# Patient Record
Sex: FEMALE | URBAN - METROPOLITAN AREA
[De-identification: names, ages, dates, MRNs, and addresses within clinical notes are randomized per-mention and may not be internally consistent; named-entity substitution may affect disease eponyms.]

---

## 2017-01-16 ENCOUNTER — OUTPATIENT (OUTPATIENT)
Dept: OUTPATIENT SERVICES | Facility: HOSPITAL | Age: 62
LOS: 1 days | Discharge: HOME | End: 2017-01-16

## 2017-01-16 DIAGNOSIS — E24.0 PITUITARY-DEPENDENT CUSHING'S DISEASE: ICD-10-CM

## 2017-06-05 ENCOUNTER — OUTPATIENT (OUTPATIENT)
Dept: OUTPATIENT SERVICES | Facility: HOSPITAL | Age: 62
LOS: 1 days | Discharge: HOME | End: 2017-06-05

## 2017-06-05 DIAGNOSIS — I48.91 UNSPECIFIED ATRIAL FIBRILLATION: ICD-10-CM

## 2017-06-05 DIAGNOSIS — E55.9 VITAMIN D DEFICIENCY, UNSPECIFIED: ICD-10-CM

## 2017-06-05 DIAGNOSIS — E03.9 HYPOTHYROIDISM, UNSPECIFIED: ICD-10-CM

## 2017-06-05 DIAGNOSIS — Z00.8 ENCOUNTER FOR OTHER GENERAL EXAMINATION: ICD-10-CM

## 2017-06-05 DIAGNOSIS — E78.4 OTHER HYPERLIPIDEMIA: ICD-10-CM

## 2017-06-05 DIAGNOSIS — E11.65 TYPE 2 DIABETES MELLITUS WITH HYPERGLYCEMIA: ICD-10-CM

## 2017-06-05 DIAGNOSIS — D53.9 NUTRITIONAL ANEMIA, UNSPECIFIED: ICD-10-CM

## 2018-04-27 ENCOUNTER — OUTPATIENT (OUTPATIENT)
Dept: OUTPATIENT SERVICES | Facility: HOSPITAL | Age: 63
LOS: 1 days | Discharge: HOME | End: 2018-04-27

## 2018-04-27 DIAGNOSIS — E05.00 THYROTOXICOSIS WITH DIFFUSE GOITER WITHOUT THYROTOXIC CRISIS OR STORM: ICD-10-CM

## 2018-04-27 DIAGNOSIS — R53.83 OTHER FATIGUE: ICD-10-CM

## 2018-08-07 ENCOUNTER — APPOINTMENT (OUTPATIENT)
Dept: ENDOCRINOLOGY | Facility: CLINIC | Age: 63
End: 2018-08-07

## 2018-11-02 ENCOUNTER — OUTPATIENT (OUTPATIENT)
Dept: OUTPATIENT SERVICES | Facility: HOSPITAL | Age: 63
LOS: 1 days | Discharge: HOME | End: 2018-11-02

## 2018-11-02 ENCOUNTER — OUTPATIENT (OUTPATIENT)
Dept: OUTPATIENT SERVICES | Facility: HOSPITAL | Age: 63
LOS: 1 days | Discharge: HOME | End: 2018-11-02
Payer: COMMERCIAL

## 2018-11-02 DIAGNOSIS — R05 COUGH: ICD-10-CM

## 2018-11-02 DIAGNOSIS — N95.1 MENOPAUSAL AND FEMALE CLIMACTERIC STATES: ICD-10-CM

## 2018-11-02 DIAGNOSIS — Z00.00 ENCOUNTER FOR GENERAL ADULT MEDICAL EXAMINATION WITHOUT ABNORMAL FINDINGS: ICD-10-CM

## 2018-11-02 DIAGNOSIS — N60.09 SOLITARY CYST OF UNSPECIFIED BREAST: ICD-10-CM

## 2018-11-02 PROCEDURE — 71046 X-RAY EXAM CHEST 2 VIEWS: CPT | Mod: 26

## 2018-11-13 ENCOUNTER — APPOINTMENT (OUTPATIENT)
Dept: ENDOCRINOLOGY | Facility: CLINIC | Age: 63
End: 2018-11-13
Payer: COMMERCIAL

## 2018-11-13 VITALS
BODY MASS INDEX: 36.8 KG/M2 | HEART RATE: 50 BPM | OXYGEN SATURATION: 97 % | SYSTOLIC BLOOD PRESSURE: 120 MMHG | WEIGHT: 200 LBS | DIASTOLIC BLOOD PRESSURE: 80 MMHG | HEIGHT: 62 IN

## 2018-11-13 DIAGNOSIS — Z80.3 FAMILY HISTORY OF MALIGNANT NEOPLASM OF BREAST: ICD-10-CM

## 2018-11-13 DIAGNOSIS — Z78.9 OTHER SPECIFIED HEALTH STATUS: ICD-10-CM

## 2018-11-13 DIAGNOSIS — R23.2 FLUSHING: ICD-10-CM

## 2018-11-13 DIAGNOSIS — Z83.511 FAMILY HISTORY OF GLAUCOMA: ICD-10-CM

## 2018-11-13 DIAGNOSIS — Z82.49 FAMILY HISTORY OF ISCHEMIC HEART DISEASE AND OTHER DISEASES OF THE CIRCULATORY SYSTEM: ICD-10-CM

## 2018-11-13 DIAGNOSIS — Z83.3 FAMILY HISTORY OF DIABETES MELLITUS: ICD-10-CM

## 2018-11-13 PROCEDURE — 99244 OFF/OP CNSLTJ NEW/EST MOD 40: CPT

## 2018-11-13 RX ORDER — ATENOLOL 25 MG/1
25 TABLET ORAL
Refills: 0 | Status: ACTIVE | COMMUNITY

## 2018-11-13 RX ORDER — IBUPROFEN 800 MG
TABLET ORAL
Refills: 0 | Status: ACTIVE | COMMUNITY

## 2018-11-13 RX ORDER — ASCORBIC ACID 500 MG
TABLET ORAL
Refills: 0 | Status: ACTIVE | COMMUNITY

## 2018-11-13 RX ORDER — FUROSEMIDE 20 MG/1
20 TABLET ORAL
Refills: 0 | Status: ACTIVE | COMMUNITY

## 2018-11-13 RX ORDER — POTASSIUM CHLORIDE 10 MEQ
10 CAPSULE, EXTENDED RELEASE ORAL
Refills: 0 | Status: ACTIVE | COMMUNITY

## 2018-11-15 LAB
T4 FREE SERPL-MCNC: 1.4 NG/DL
TSH RECEPTOR AB: 0.94 IU/L
TSH SERPL-ACNC: 2.19 UIU/ML
TSI ACT/NOR SER: <0.1 IU/L

## 2019-03-18 ENCOUNTER — APPOINTMENT (OUTPATIENT)
Dept: ENDOCRINOLOGY | Facility: CLINIC | Age: 64
End: 2019-03-18
Payer: COMMERCIAL

## 2019-03-18 VITALS
SYSTOLIC BLOOD PRESSURE: 118 MMHG | OXYGEN SATURATION: 98 % | WEIGHT: 208 LBS | BODY MASS INDEX: 38.28 KG/M2 | HEIGHT: 62 IN | HEART RATE: 82 BPM | DIASTOLIC BLOOD PRESSURE: 78 MMHG

## 2019-03-18 PROCEDURE — 99213 OFFICE O/P EST LOW 20 MIN: CPT

## 2019-03-18 NOTE — ASSESSMENT
[FreeTextEntry1] : Ms. NEHA RUVALCABA is a 63 year old female here to establish care for Graves' disease and obesity. \par \par 1) Graves' disease\par -TSH, TSHRAB and TSI was checked in Nov 2018 and they were all within normal limits.  \par -For now, no treatment is needed given that patient is biochemically and clinically euthyroid\par -Will check TPO antibody, free T4 and TSH given hypothyroid symptoms. \par \par 2) Obesity: Her thyroid function is normal unlikely related to hypothyroidism. \par Consider referral to weight management program.  \par Discussed diet and exercise \par Will check salivary cortisol. \par \par 3)Hot flashes\par I discussed with patient that the woman some initiative study has shown adverse effects of hormone replacement therapy in postmenopausal woman over the age of 60 years.  She will continue the risks and benefits discussion with her ob/gyn and hormone specialist.

## 2019-03-18 NOTE — DATA REVIEWED
[FreeTextEntry1] : 11/2/2018\par WBC 5.18\par RBC 4.76\par HEMOGLOBIN 13.9\par HCT 42.5\par MCH 29.2\par RDW 12.4\par \par EGFR 87\par MAGNESIUM 2.2\par EGFR 75\par SODIUM 141\par POTASSIUM 4.0\par CHLORIDE 105\par CO2 28\par ANION GAP 8\par GLUCOSE 95\par AST 16\par ALT 35\par ALK PHOS 54\par CHOLESTEROL 214\par \par CHOLESTEROL/HDL 3.9\par A1C 5.9%\par FERRITIN 102\par IRON 98\par VITAMIN D 25 43.5\par FSH 55.8\par LH 31.4\par TSH 1.65\par  9\par \par 2/26/2018\par RIGHT LOBE 5.4X1.2X1.8CM\par LEFT LOBE: 4.5X1.3X1.5CM \par ISTHMUS: 2MM\par RIGHT LOBE: 3MM COLLOID CYSTS IN THE POSTERIOR MID RIGHT THYROID LOBE.  \par \par Will be forwarding results\par

## 2019-03-18 NOTE — HISTORY OF PRESENT ILLNESS
[FreeTextEntry1] : Ms. NEHA RUVALCABA is 63 year old female here for endocrine follow up.  \par \par About a few years ago, she had a fall in her house.  She had a concussion and she was diagnosed with Graves' disease based on the MRI imaging.  She saw two endocrinologist since and she was told that thyroid functions had been normal.  She had the Graves' antibodies but no treatment was indicated.  Since then, she never had a problem with her thyroid function tests.  She was prescribed Atenolol for intermittent palpitation which had help to alleviate that symptoms. \par \par Currently, she has a lot of symptoms of under active thyroid including fatigue, brittle nails, constipation, migraine.  Sometimes she has insomnia and \par \par The patient endorses the following eye related symptoms, she was diagnosed with Graves orbitopathy.  She follows up with Dr. White from [place].  She saw Dr. Dipesh Wang.  (endocrinologist).  There is a sense of irritation in the eyes.  There is excessive tearing . There is eye or retro orbital discomfort or pain. There is no blurring of vision.  There is no double vision. she had recent surgery on the on the left eye.  \par \par She has hot flashes, in the past had normal metanephrines.  This occurred after menopause which was about 3-4 years ago.\par She had asked for hormone replacement therapy abut states due to family history of BRCA+, (herself was tested and was negative per patient), she was advised not to take the treatment.  She is seeing a hormone specialist.  She states that she is pending further laboratory and imaging workup, she may be starting on hormone replacement therapy.\par

## 2019-03-18 NOTE — REVIEW OF SYSTEMS
[Negative] : Heme/Lymph [Dry Eyes] : dryness of the eyes [Joint Pain] : joint pain [Joint Stiffness] : joint stiffness [Muscle Cramps] : muscle cramps [Muscle Weakness] : muscle weakness [Myalgia] : myalgia  [Back Pain] : back pain [Hair Loss] : hair loss

## 2019-03-18 NOTE — PHYSICAL EXAM
[Well Nourished] : well nourished [Well Developed] : well developed [Normal Hearing] : hearing was normal [Normal Nasal Mucosa] : the nasal mucosa was normal [No LAD] : no lymphadenopathy [Thyroid Not Enlarged] : the thyroid was not enlarged [Normal Rate] : heart rate was normal  [Normal S1, S2] : normal S1 and S2 [Regular Rhythm] : with a regular rhythm [Not Distended] : not distended [No Spinal Tenderness] : no spinal tenderness [No Motor Deficits] : the motor exam was normal [Normal Insight/Judgement] : insight and judgment were intact [No Tremors] : no tremors [Normal Affect] : the affect was normal [Normal Mood] : the mood was normal [de-identified] : wearing sun glasses.

## 2019-09-20 ENCOUNTER — EMERGENCY (EMERGENCY)
Facility: HOSPITAL | Age: 64
LOS: 1 days | Discharge: HOME | End: 2019-09-20
Attending: INTERNAL MEDICINE | Admitting: INTERNAL MEDICINE
Payer: COMMERCIAL

## 2019-09-20 VITALS
RESPIRATION RATE: 18 BRPM | HEIGHT: 62 IN | DIASTOLIC BLOOD PRESSURE: 88 MMHG | SYSTOLIC BLOOD PRESSURE: 148 MMHG | OXYGEN SATURATION: 95 % | HEART RATE: 81 BPM | TEMPERATURE: 98 F | WEIGHT: 188.94 LBS

## 2019-09-20 PROCEDURE — 99284 EMERGENCY DEPT VISIT MOD MDM: CPT | Mod: 25

## 2019-09-20 PROCEDURE — 96372 THER/PROPH/DIAG INJ SC/IM: CPT

## 2019-09-20 PROCEDURE — 99283 EMERGENCY DEPT VISIT LOW MDM: CPT

## 2019-09-20 RX ORDER — DEXAMETHASONE 0.5 MG/5ML
10 ELIXIR ORAL ONCE
Refills: 0 | Status: COMPLETED | OUTPATIENT
Start: 2019-09-20 | End: 2019-09-20

## 2019-09-20 RX ORDER — FAMOTIDINE 10 MG/ML
20 INJECTION INTRAVENOUS DAILY
Refills: 0 | Status: DISCONTINUED | OUTPATIENT
Start: 2019-09-20 | End: 2019-10-04

## 2019-09-20 RX ORDER — DIPHENHYDRAMINE HCL 50 MG
1 CAPSULE ORAL
Qty: 30 | Refills: 0
Start: 2019-09-20 | End: 2019-09-29

## 2019-09-20 RX ORDER — FAMOTIDINE 10 MG/ML
1 INJECTION INTRAVENOUS
Qty: 20 | Refills: 0
Start: 2019-09-20 | End: 2019-09-29

## 2019-09-20 RX ORDER — DIPHENHYDRAMINE HCL 50 MG
25 CAPSULE ORAL EVERY 4 HOURS
Refills: 0 | Status: DISCONTINUED | OUTPATIENT
Start: 2019-09-20 | End: 2019-10-04

## 2019-09-20 RX ORDER — AMOXICILLIN 250 MG/5ML
1 SUSPENSION, RECONSTITUTED, ORAL (ML) ORAL
Qty: 20 | Refills: 0
Start: 2019-09-20 | End: 2019-09-29

## 2019-09-20 RX ADMIN — Medication 10 MILLIGRAM(S): at 23:37

## 2019-09-20 RX ADMIN — Medication 1 TABLET(S): at 23:37

## 2019-09-20 RX ADMIN — FAMOTIDINE 20 MILLIGRAM(S): 10 INJECTION INTRAVENOUS at 23:38

## 2019-09-20 RX ADMIN — Medication 25 MILLIGRAM(S): at 23:37

## 2019-09-20 NOTE — ED ADULT NURSE NOTE - OBJECTIVE STATEMENT
pt  states she had two reddened areas on her right arm which started yesterday two reddened sarah noted

## 2019-09-20 NOTE — ED PROVIDER NOTE - PATIENT PORTAL LINK FT
You can access the FollowMyHealth Patient Portal offered by [facility] by registering at the following website: [email or web address]. By joining [facility]’s FollowMyHealth portal, you will also be able to view your health information using other applications (apps) compatible with our system.

## 2019-09-20 NOTE — ED ADULT NURSE NOTE - NSIMPLEMENTINTERV_GEN_ALL_ED
Implemented All Universal Safety Interventions:  [place] to call system. Call bell, personal items and telephone within reach. Instruct patient to call for assistance. Room bathroom lighting operational. Non-slip footwear when patient is off stretcher. Physically safe environment: no spills, clutter or unnecessary equipment. Stretcher in lowest position, wheels locked, appropriate side rails in place.

## 2019-09-20 NOTE — ED PROVIDER NOTE - OBJECTIVE STATEMENT
insect bite multiple sites R UL face R arm bite is swollen red painful, also has rash L thigh 2 weeks

## 2019-09-20 NOTE — ED ADULT TRIAGE NOTE - CHIEF COMPLAINT QUOTE
Patient presents with possible allergic reaction and/or bug bites. Patient verbalizes throat tightness. Patient able to speak in clear and complete sentences.

## 2019-09-21 VITALS
SYSTOLIC BLOOD PRESSURE: 142 MMHG | HEART RATE: 78 BPM | RESPIRATION RATE: 17 BRPM | TEMPERATURE: 98 F | DIASTOLIC BLOOD PRESSURE: 85 MMHG | OXYGEN SATURATION: 98 %

## 2019-09-23 ENCOUNTER — APPOINTMENT (OUTPATIENT)
Dept: ENDOCRINOLOGY | Facility: CLINIC | Age: 64
End: 2019-09-23

## 2019-12-03 ENCOUNTER — APPOINTMENT (OUTPATIENT)
Dept: ENDOCRINOLOGY | Facility: CLINIC | Age: 64
End: 2019-12-03

## 2020-01-17 ENCOUNTER — TRANSCRIPTION ENCOUNTER (OUTPATIENT)
Age: 65
End: 2020-01-17

## 2020-03-24 ENCOUNTER — APPOINTMENT (OUTPATIENT)
Dept: ENDOCRINOLOGY | Facility: CLINIC | Age: 65
End: 2020-03-24

## 2020-07-27 ENCOUNTER — APPOINTMENT (OUTPATIENT)
Dept: ENDOCRINOLOGY | Facility: CLINIC | Age: 65
End: 2020-07-27

## 2020-09-16 NOTE — ED ADULT TRIAGE NOTE - HEIGHT IN FEET
Size Of Lesion In Cm (Optional): 0
Detail Level: Simple
Body Location Override (Optional - Billing Will Still Be Based On Selected Body Map Location If Applicable): R mid back
5

## 2020-11-09 ENCOUNTER — APPOINTMENT (OUTPATIENT)
Dept: ENDOCRINOLOGY | Facility: CLINIC | Age: 65
End: 2020-11-09

## 2021-08-10 ENCOUNTER — APPOINTMENT (OUTPATIENT)
Dept: PULMONOLOGY | Facility: CLINIC | Age: 66
End: 2021-08-10

## 2021-09-28 ENCOUNTER — APPOINTMENT (OUTPATIENT)
Dept: PULMONOLOGY | Facility: CLINIC | Age: 66
End: 2021-09-28
Payer: COMMERCIAL

## 2021-09-28 VITALS
BODY MASS INDEX: 38.46 KG/M2 | HEIGHT: 62 IN | OXYGEN SATURATION: 95 % | HEART RATE: 74 BPM | TEMPERATURE: 97.4 F | WEIGHT: 209 LBS | SYSTOLIC BLOOD PRESSURE: 120 MMHG | DIASTOLIC BLOOD PRESSURE: 75 MMHG

## 2021-09-28 PROCEDURE — 99243 OFF/OP CNSLTJ NEW/EST LOW 30: CPT

## 2021-09-28 NOTE — ADDENDUM
[FreeTextEntry1] : Risks and benefits of Covid vaccination discussed.  Patient has been encouraged to get Covid vaccination

## 2021-10-26 ENCOUNTER — RX RENEWAL (OUTPATIENT)
Age: 66
End: 2021-10-26

## 2021-11-02 ENCOUNTER — APPOINTMENT (OUTPATIENT)
Dept: ENDOCRINOLOGY | Facility: CLINIC | Age: 66
End: 2021-11-02
Payer: COMMERCIAL

## 2021-11-02 VITALS
OXYGEN SATURATION: 97 % | SYSTOLIC BLOOD PRESSURE: 120 MMHG | HEART RATE: 59 BPM | DIASTOLIC BLOOD PRESSURE: 80 MMHG | BODY MASS INDEX: 36.21 KG/M2 | WEIGHT: 198 LBS | TEMPERATURE: 97.8 F

## 2021-11-02 DIAGNOSIS — E05.00 THYROTOXICOSIS WITH DIFFUSE GOITER W/OUT THYROTOXIC CRISIS OR STORM: ICD-10-CM

## 2021-11-02 PROCEDURE — 99214 OFFICE O/P EST MOD 30 MIN: CPT

## 2021-11-02 NOTE — PHYSICAL EXAM
[Alert] : alert [Well Nourished] : well nourished [No Acute Distress] : no acute distress [Well Developed] : well developed [Normal Sclera/Conjunctiva] : normal sclera/conjunctiva [EOMI] : extra ocular movement intact [Normal Oropharynx] : the oropharynx was normal [Thyroid Not Enlarged] : the thyroid was not enlarged [No Thyroid Nodules] : no palpable thyroid nodules [No Respiratory Distress] : no respiratory distress [No Accessory Muscle Use] : no accessory muscle use [Clear to Auscultation] : lungs were clear to auscultation bilaterally [Normal S1, S2] : normal S1 and S2 [Normal Rate] : heart rate was normal [Regular Rhythm] : with a regular rhythm [No Edema] : no peripheral edema [Pedal Pulses Normal] : the pedal pulses are present [Normal Bowel Sounds] : normal bowel sounds [Not Tender] : non-tender [Not Distended] : not distended [Soft] : abdomen soft [Normal Anterior Cervical Nodes] : no anterior cervical lymphadenopathy [Normal Posterior Cervical Nodes] : no posterior cervical lymphadenopathy [No Spinal Tenderness] : no spinal tenderness [Spine Straight] : spine straight [No Stigmata of Cushings Syndrome] : no stigmata of Cushings Syndrome [Normal Gait] : normal gait [Normal Strength/Tone] : muscle strength and tone were normal [No Rash] : no rash [Acanthosis Nigricans] : no acanthosis nigricans [Normal Reflexes] : deep tendon reflexes were 2+ and symmetric [No Tremors] : no tremors [Oriented x3] : oriented to person, place, and time [de-identified] : There is very mild proptosis.  Mild lid lag

## 2021-11-02 NOTE — HISTORY OF PRESENT ILLNESS
[FreeTextEntry1] : Ms. NEHA RUVALCABA is 66 year old female here for endocrine follow up.  \par \par About 5-6 years ago, she had a fall in her house.  She had a concussion and she was diagnosed with Graves' disease based on the MRI imaging.  She saw two endocrinologist since and she was told that thyroid functions had been normal.  She had the Graves' antibodies but no treatment was indicated.  Since then, she never had a problem with her thyroid function tests.  She was prescribed Atenolol for intermittent palpitation which had help to alleviate that symptoms. \par \par Currently, she has a lot of symptoms of under active thyroid including fatigue, brittle nails, constipation, migraine.  Sometimes she has insomnia and \par \par The patient endorses the following eye related symptoms, she was diagnosed with Graves orbitopathy.  She follows up with Dr. White from [place].  She saw Dr. Dipesh Wang.  (endocrinologist).  There is a sense of irritation in the eyes.  There is excessive tearing . There is eye or retro orbital discomfort or pain. There is no blurring of vision.  There is no double vision. she had recent surgery on the on the left eye.  \par \par She has hot flashes, in the past had normal metanephrines.  This occurred after menopause which was about 3-4 years ago.\par She had asked for hormone replacement therapy abut states due to family history of BRCA+, (herself was tested and was negative per patient), she was advised not to take the treatment. \par \par Last visit in 2019.  Recent blood work in July 2021 showed normal thyroid function. \par \par She wants to revisit Grave eye disease specailist.  She will see Dr. White again.  \par

## 2021-11-02 NOTE — DATA REVIEWED
[FreeTextEntry1] : Blood work done on 6/21/2021\par Total cholesterol 197\par HDL 55\par Triglyceride 95\par \par Non-\par Hemoglobin A1c 5.8%\par B12 >2000\par Folate 17.5\par Ferritin 126\par Vitamin D 29.6\par A.m. cortisol 8.6\par TSH 1.45\par T3 124\par Free T4 1.2\par Thyroglobulin 16\par Estradiol <5\par Progesterone 0.1\par FSH 56.7\par Thyroperoxidase antibody <10\par CEA 0.9\par CA-125 8\par CA 27–29 9.4\par C-reactive protein 5\par \par Thyroid ultrasound October 2020\par Findings: Isthmus: Normal size in AP dimension measuring 0.3 cm\par Right lobe: Measures 4.5 x 1.2 x 1.5 cm in sagittal by AP by transverse dimensions.\par 4.00 cc.  In the interpolar 3 x 2 x 3 mm colloid cyst stable does not need to be follow-up\par Color Doppler was unremarkable\par \par Left lobe: Measures 4.4 x 0.9 x 1.9 cm in sagittal by AP by transverse dimension.  Volume 3.4 cc.  Slightly heterogeneous without discrete nodule.  Color Doppler was unremarkable.\par \par Impression: 3 mm) does not need to follow-up.

## 2021-11-02 NOTE — ASSESSMENT
[FreeTextEntry1] : Patient is a 66-year-old woman here to to follow-up for Graves' disease.  Last seen in March 2019.\par \par 1.  Graves' disease\par TSH, TSH receptor antibody and TSI check in November 2018 were within normal limits.\par Repeat TFT\par Currently not on any thyroid medication\par We will repeat thyroid function level along with TSH receptor antibody and TSI\par \par 2.  Obesity\par Weight management referral was provided last time\par Discussed diet and exercise\par \par 3.  Graves' ophthalmopathy\par Referral for neuro-ophthalmologist/oculoplastic surgeon provided for patient.\par She will follow-up with Dr. White at [place].  She might see Dr. Tito Hdez. \par \par 4.  Colloid cyst\par Patient is noted to have a 3 mm right colloid cyst on the last ultrasound October 13, 2020.\par Discussed with patient that does not need to be follow-up with thyroid ultrasound in another 2 to 3 years.\par Patient stated that she wants to repeat the thyroid ultrasound now.  She is willing to pay out of pocket if it's not covered with medical insurance.\par \par FU in alejandro 2022\par If abnormal

## 2021-11-03 LAB
T3 SERPL-MCNC: 109 NG/DL
T3FREE SERPL-MCNC: 3 PG/ML
T4 FREE SERPL-MCNC: 1.3 NG/DL
T4 SERPL-MCNC: 8.1 UG/DL
TSH SERPL-ACNC: 1.42 UIU/ML

## 2021-11-04 LAB — TSI ACT/NOR SER: <0.1 IU/L

## 2021-11-05 LAB — TSH RECEPTOR AB: <1.1 IU/L

## 2021-11-27 ENCOUNTER — RX RENEWAL (OUTPATIENT)
Age: 66
End: 2021-11-27

## 2021-11-30 ENCOUNTER — OUTPATIENT (OUTPATIENT)
Dept: OUTPATIENT SERVICES | Facility: HOSPITAL | Age: 66
LOS: 1 days | Discharge: HOME | End: 2021-11-30

## 2021-11-30 DIAGNOSIS — R79.9 ABNORMAL FINDING OF BLOOD CHEMISTRY, UNSPECIFIED: ICD-10-CM

## 2021-12-01 ENCOUNTER — APPOINTMENT (OUTPATIENT)
Dept: HEMATOLOGY ONCOLOGY | Facility: CLINIC | Age: 66
End: 2021-12-01
Payer: COMMERCIAL

## 2021-12-01 ENCOUNTER — RESULT REVIEW (OUTPATIENT)
Age: 66
End: 2021-12-01

## 2021-12-01 ENCOUNTER — NON-APPOINTMENT (OUTPATIENT)
Age: 66
End: 2021-12-01

## 2021-12-01 VITALS
RESPIRATION RATE: 17 BRPM | HEART RATE: 91 BPM | TEMPERATURE: 97 F | DIASTOLIC BLOOD PRESSURE: 83 MMHG | SYSTOLIC BLOOD PRESSURE: 130 MMHG | HEIGHT: 62.2 IN | WEIGHT: 220.24 LBS | OXYGEN SATURATION: 94 % | BODY MASS INDEX: 40.02 KG/M2

## 2021-12-01 DIAGNOSIS — Z80.3 FAMILY HISTORY OF MALIGNANT NEOPLASM OF BREAST: ICD-10-CM

## 2021-12-01 DIAGNOSIS — Z80.49 FAMILY HISTORY OF MALIGNANT NEOPLASM OF OTHER GENITAL ORGANS: ICD-10-CM

## 2021-12-01 DIAGNOSIS — Z92.89 PERSONAL HISTORY OF OTHER MEDICAL TREATMENT: ICD-10-CM

## 2021-12-01 DIAGNOSIS — Z98.890 OTHER SPECIFIED POSTPROCEDURAL STATES: ICD-10-CM

## 2021-12-01 DIAGNOSIS — Z87.891 PERSONAL HISTORY OF NICOTINE DEPENDENCE: ICD-10-CM

## 2021-12-01 DIAGNOSIS — Z80.9 FAMILY HISTORY OF MALIGNANT NEOPLASM, UNSPECIFIED: ICD-10-CM

## 2021-12-01 LAB
APTT BLD: 29.3 SEC
BASOPHILS # BLD AUTO: 0.07 K/UL — SIGNIFICANT CHANGE UP (ref 0–0.2)
BASOPHILS NFR BLD AUTO: 1.1 % — SIGNIFICANT CHANGE UP (ref 0–2)
EOSINOPHIL # BLD AUTO: 0.13 K/UL — SIGNIFICANT CHANGE UP (ref 0–0.5)
EOSINOPHIL NFR BLD AUTO: 2.1 % — SIGNIFICANT CHANGE UP (ref 0–6)
FERRITIN SERPL-MCNC: 100 NG/ML
FOLATE SERPL-MCNC: 11.3 NG/ML
HCT VFR BLD CALC: 48.1 % — HIGH (ref 34.5–45)
HGB BLD-MCNC: 15.1 G/DL — SIGNIFICANT CHANGE UP (ref 11.5–15.5)
IMM GRANULOCYTES NFR BLD AUTO: 0.3 % — SIGNIFICANT CHANGE UP (ref 0–1.5)
INR PPP: 0.92 RATIO
IRON SATN MFR SERPL: 19 %
IRON SERPL-MCNC: 70 UG/DL
LYMPHOCYTES # BLD AUTO: 1.99 K/UL — SIGNIFICANT CHANGE UP (ref 1–3.3)
LYMPHOCYTES # BLD AUTO: 31.7 % — SIGNIFICANT CHANGE UP (ref 13–44)
MCHC RBC-ENTMCNC: 29 PG — SIGNIFICANT CHANGE UP (ref 27–34)
MCHC RBC-ENTMCNC: 31.4 G/DL — LOW (ref 32–36)
MCV RBC AUTO: 92.3 FL — SIGNIFICANT CHANGE UP (ref 80–100)
MONOCYTES # BLD AUTO: 0.49 K/UL — SIGNIFICANT CHANGE UP (ref 0–0.9)
MONOCYTES NFR BLD AUTO: 7.8 % — SIGNIFICANT CHANGE UP (ref 2–14)
NEUTROPHILS # BLD AUTO: 3.57 K/UL — SIGNIFICANT CHANGE UP (ref 1.8–7.4)
NEUTROPHILS NFR BLD AUTO: 57 % — SIGNIFICANT CHANGE UP (ref 43–77)
NRBC # BLD: 0 /100 WBCS — SIGNIFICANT CHANGE UP (ref 0–0)
PLATELET # BLD AUTO: 148 K/UL — LOW (ref 150–400)
PT BLD: 11 SEC
RBC # BLD: 5.21 M/UL — HIGH (ref 3.8–5.2)
RBC # FLD: 12.5 % — SIGNIFICANT CHANGE UP (ref 10.3–14.5)
RETICS #: 82.8 K/UL — SIGNIFICANT CHANGE UP (ref 25–125)
RETICS/RBC NFR: 1.6 % — SIGNIFICANT CHANGE UP (ref 0.5–2.5)
TIBC SERPL-MCNC: 368 UG/DL
UIBC SERPL-MCNC: 299 UG/DL
VIT B12 SERPL-MCNC: 704 PG/ML
WBC # BLD: 6.27 K/UL — SIGNIFICANT CHANGE UP (ref 3.8–10.5)
WBC # FLD AUTO: 6.27 K/UL — SIGNIFICANT CHANGE UP (ref 3.8–10.5)

## 2021-12-01 PROCEDURE — 99243 OFF/OP CNSLTJ NEW/EST LOW 30: CPT

## 2021-12-01 RX ORDER — ASPIRIN 325 MG/1
325 TABLET, FILM COATED ORAL
Refills: 0 | Status: ACTIVE | COMMUNITY

## 2021-12-01 RX ORDER — MONTELUKAST 10 MG/1
10 TABLET, FILM COATED ORAL DAILY
Qty: 30 | Refills: 0 | Status: DISCONTINUED | COMMUNITY
Start: 2021-09-28 | End: 2021-12-01

## 2021-12-01 NOTE — REASON FOR VISIT
[Initial Consultation] : an initial consultation for [Spouse] : spouse [FreeTextEntry2] : elevated Hct.

## 2021-12-01 NOTE — ASSESSMENT
[FreeTextEntry1] : Lab work reviewed.\par Mildly elevated hematocrit–differential diagnosis discussed with patient.  Reviewed potential complications if underlying myeloproliferative disorder, and treatment available.? Secondary/reactive process-?hemoconcentration with daily diuretic use.\par Follow-up lab work to be done, including erythropoietin level, JAK2 mutation studies.  P.O. hydration as tolerated.\par \par Pending the above and should hematologic scenario worsen/change, can decide if prudent to pursue BMB to rule out underlying evolving MPD, and/or consider empiric phlebotomy.\par \par Patient was given the opportunity to ask questions.  Her questions have been answered to her apparent satisfaction at this time.  She expressed her understanding and willingness to comply with recommended follow-up.\par

## 2021-12-01 NOTE — RESULTS/DATA
[FreeTextEntry1] : 7/13/2021–hemoglobin 14.4, hematocrit 46.1, RBC 4.84, WBC 7.33 with 66% neutrophils, 19% lymphocytes, platelet count 174,000.  SPEP with normal electrophoresis pattern.  Calcium 9.7, BLAYNE negative, rheumatoid factor less than 10.\par 6/21/2021–hemoglobin 13.9, hematocrit 46.9, RBC 4.86, WBC 4.45, lately count 191,000, sed rate 13, hemoglobin electrophoresis with normal pattern.  Creatinine 0.56.  Total bilirubin 0.4.  AST 21/ALT 27/alkaline phosphatase 49.  B12 greater than 2000, folate 17.5, serum iron 100, 28% iron saturation, ferritin 126.  Homocysteine 6.9.

## 2021-12-01 NOTE — HISTORY OF PRESENT ILLNESS
[de-identified] : 12/1/21-Patient presenting at the request of her PCP for an elevated Hct. found on routine lab work.\par \par No pulmonary/GI//bony/neuro complaints at this time. No fevers. No H/A. No h/o abnormal bleeding.\par Has appt. scheduled with Dr. Damon (vascular) for varicose veins.\par Seeing Dr. Balderas (endocrine) for hyperthyroidism.\par Reports has been tested BRCA negative (+FH cancers).\par Takes Motrin daily for "frozen shoulders." Bruises easily.\par Has chosen not to get COVID vaccines.

## 2021-12-01 NOTE — CONSULT LETTER
[Dear  ___] : Dear  [unfilled], [Consult Letter:] : I had the pleasure of evaluating your patient, [unfilled]. [Please see my note below.] : Please see my note below. [Consult Closing:] : Thank you very much for allowing me to participate in the care of this patient.  If you have any questions, please do not hesitate to contact me. [Sincerely,] : Sincerely, [FreeTextEntry3] : May Joyce MD

## 2021-12-01 NOTE — REVIEW OF SYSTEMS
[Negative] : Allergic/Immunologic [Patient Intake Form Reviewed] : Patient intake form was reviewed [Lower Ext Edema] : lower extremity edema [Swollen Glands] : no swollen glands [FreeTextEntry9] : shoulder stiffness

## 2021-12-02 LAB
EPO SERPL-MCNC: 6.8 MIU/ML
FACT VIII ACT/NOR PPP: 151 %
VWF AG PPP IA-ACNC: 179 %
VWF:RCO ACT/NOR PPP PL AGG: 161 %

## 2021-12-10 LAB — JAK2RLX: NORMAL

## 2022-01-02 ENCOUNTER — OUTPATIENT (OUTPATIENT)
Dept: OUTPATIENT SERVICES | Facility: HOSPITAL | Age: 67
LOS: 1 days | Discharge: HOME | End: 2022-01-02

## 2022-01-02 DIAGNOSIS — R79.9 ABNORMAL FINDING OF BLOOD CHEMISTRY, UNSPECIFIED: ICD-10-CM

## 2022-01-04 ENCOUNTER — APPOINTMENT (OUTPATIENT)
Dept: HEMATOLOGY ONCOLOGY | Facility: CLINIC | Age: 67
End: 2022-01-04
Payer: COMMERCIAL

## 2022-01-04 PROCEDURE — 99213 OFFICE O/P EST LOW 20 MIN: CPT | Mod: 95

## 2022-01-04 NOTE — ASSESSMENT
[FreeTextEntry1] : Lab work reviewed.\par Mildly elevated hematocrit–differential diagnosis discussed with patient. Erythropoietin level WNL and TUTU 2 mutation studies normal. Currently clinically suspect secondary/reactive process-?hemoconcentration with daily diuretic use.\par P.O. hydration as tolerated. Hematologic surveillance for now.\par \par Mild thrombocytopenia-interval repeat CBC to be done-if persistent-further evaluation warranted. No overt bleeding reported at present.\par \par Should hematologic scenario worsen/change, can decide if prudent to pursue BMB to rule out underlying evolving BM/MPD. \par \par Patient was given the opportunity to ask questions.  Her questions have been answered to her apparent satisfaction at this time.  She expressed her understanding and willingness to comply with recommended follow-up.\par

## 2022-01-04 NOTE — HISTORY OF PRESENT ILLNESS
[de-identified] : 12/1/21-Patient presented at the request of her PCP for an elevated Hct. found on routine lab work.\par \par \par  [de-identified] : No pulmonary/GI//bony/neuro complaints at this time. No fevers. No H/A. No h/o abnormal bleeding.\soraida Has appt. with Dr. Damon (vascular) for varicose veins in 2/2022.\soraida Sees Dr. Balderas (endocrine) for hyperthyroidism-told recent evaluation was "fine."\soraida Has chosen not to get COVID vaccines.

## 2022-01-04 NOTE — PHYSICAL EXAM
[Normal] : affect appropriate [de-identified] : breathing appeared normal [de-identified] : coloration appeared normal

## 2022-01-04 NOTE — REASON FOR VISIT
[Home] : at home, [unfilled] , at the time of the visit. [Medical Office: ([facility])___] : at the medical office located in  [Verbal consent obtained from patient] : the patient, [unfilled] [Follow-Up Visit] : a follow-up visit for [Spouse] : spouse [FreeTextEntry2] : elevated Hematocrit

## 2022-01-07 ENCOUNTER — NON-APPOINTMENT (OUTPATIENT)
Age: 67
End: 2022-01-07

## 2022-03-29 ENCOUNTER — OUTPATIENT (OUTPATIENT)
Dept: OUTPATIENT SERVICES | Facility: HOSPITAL | Age: 67
LOS: 1 days | Discharge: HOME | End: 2022-03-29

## 2022-03-29 DIAGNOSIS — R79.9 ABNORMAL FINDING OF BLOOD CHEMISTRY, UNSPECIFIED: ICD-10-CM

## 2022-04-01 ENCOUNTER — OUTPATIENT (OUTPATIENT)
Dept: OUTPATIENT SERVICES | Facility: HOSPITAL | Age: 67
LOS: 1 days | End: 2022-04-01
Payer: COMMERCIAL

## 2022-04-01 ENCOUNTER — RESULT REVIEW (OUTPATIENT)
Age: 67
End: 2022-04-01

## 2022-04-01 ENCOUNTER — APPOINTMENT (OUTPATIENT)
Dept: HEMATOLOGY ONCOLOGY | Facility: CLINIC | Age: 67
End: 2022-04-01

## 2022-04-01 DIAGNOSIS — R71.8 OTHER ABNORMALITY OF RED BLOOD CELLS: ICD-10-CM

## 2022-04-01 LAB
BASOPHILS # BLD AUTO: 0.04 K/UL — SIGNIFICANT CHANGE UP (ref 0–0.2)
BASOPHILS NFR BLD AUTO: 0.6 % — SIGNIFICANT CHANGE UP (ref 0–2)
EOSINOPHIL # BLD AUTO: 0.11 K/UL — SIGNIFICANT CHANGE UP (ref 0–0.5)
EOSINOPHIL NFR BLD AUTO: 1.8 % — SIGNIFICANT CHANGE UP (ref 0–6)
HCT VFR BLD CALC: 40.6 % — SIGNIFICANT CHANGE UP (ref 34.5–45)
HGB BLD-MCNC: 12.9 G/DL — SIGNIFICANT CHANGE UP (ref 11.5–15.5)
IMM GRANULOCYTES NFR BLD AUTO: 0.3 % — SIGNIFICANT CHANGE UP (ref 0–1.5)
LYMPHOCYTES # BLD AUTO: 1.96 K/UL — SIGNIFICANT CHANGE UP (ref 1–3.3)
LYMPHOCYTES # BLD AUTO: 31.3 % — SIGNIFICANT CHANGE UP (ref 13–44)
MCHC RBC-ENTMCNC: 29.4 PG — SIGNIFICANT CHANGE UP (ref 27–34)
MCHC RBC-ENTMCNC: 31.8 G/DL — LOW (ref 32–36)
MCV RBC AUTO: 92.5 FL — SIGNIFICANT CHANGE UP (ref 80–100)
MONOCYTES # BLD AUTO: 0.45 K/UL — SIGNIFICANT CHANGE UP (ref 0–0.9)
MONOCYTES NFR BLD AUTO: 7.2 % — SIGNIFICANT CHANGE UP (ref 2–14)
NEUTROPHILS # BLD AUTO: 3.68 K/UL — SIGNIFICANT CHANGE UP (ref 1.8–7.4)
NEUTROPHILS NFR BLD AUTO: 58.8 % — SIGNIFICANT CHANGE UP (ref 43–77)
NRBC # BLD: 0 /100 WBCS — SIGNIFICANT CHANGE UP (ref 0–0)
PLATELET # BLD AUTO: 168 K/UL — SIGNIFICANT CHANGE UP (ref 150–400)
RBC # BLD: 4.39 M/UL — SIGNIFICANT CHANGE UP (ref 3.8–5.2)
RBC # FLD: 12.7 % — SIGNIFICANT CHANGE UP (ref 10.3–14.5)
WBC # BLD: 6.26 K/UL — SIGNIFICANT CHANGE UP (ref 3.8–10.5)
WBC # FLD AUTO: 6.26 K/UL — SIGNIFICANT CHANGE UP (ref 3.8–10.5)

## 2022-04-01 PROCEDURE — 86850 RBC ANTIBODY SCREEN: CPT

## 2022-04-01 PROCEDURE — 86900 BLOOD TYPING SEROLOGIC ABO: CPT

## 2022-04-01 PROCEDURE — 86901 BLOOD TYPING SEROLOGIC RH(D): CPT

## 2022-04-13 ENCOUNTER — NON-APPOINTMENT (OUTPATIENT)
Age: 67
End: 2022-04-13

## 2022-04-14 ENCOUNTER — OUTPATIENT (OUTPATIENT)
Dept: OUTPATIENT SERVICES | Facility: HOSPITAL | Age: 67
LOS: 1 days | End: 2022-04-14
Payer: COMMERCIAL

## 2022-04-14 DIAGNOSIS — R06.2 WHEEZING: ICD-10-CM

## 2022-04-14 PROCEDURE — 94060 EVALUATION OF WHEEZING: CPT

## 2022-04-14 PROCEDURE — 94729 DIFFUSING CAPACITY: CPT | Mod: 26

## 2022-04-14 PROCEDURE — 94729 DIFFUSING CAPACITY: CPT

## 2022-04-14 PROCEDURE — 94726 PLETHYSMOGRAPHY LUNG VOLUMES: CPT | Mod: 26

## 2022-04-14 PROCEDURE — 94726 PLETHYSMOGRAPHY LUNG VOLUMES: CPT

## 2022-04-14 PROCEDURE — 94060 EVALUATION OF WHEEZING: CPT | Mod: 26

## 2022-05-09 ENCOUNTER — APPOINTMENT (OUTPATIENT)
Dept: NEUROLOGY | Facility: CLINIC | Age: 67
End: 2022-05-09
Payer: COMMERCIAL

## 2022-05-09 VITALS
SYSTOLIC BLOOD PRESSURE: 128 MMHG | WEIGHT: 198 LBS | BODY MASS INDEX: 35.08 KG/M2 | HEART RATE: 84 BPM | HEIGHT: 63 IN | DIASTOLIC BLOOD PRESSURE: 72 MMHG

## 2022-05-09 DIAGNOSIS — G44.40 DRUG-INDUCED HEADACHE, NOT ELSEWHERE CLASSIFIED, NOT INTRACTABLE: ICD-10-CM

## 2022-05-09 DIAGNOSIS — F51.04 PSYCHOPHYSIOLOGIC INSOMNIA: ICD-10-CM

## 2022-05-09 PROCEDURE — 99205 OFFICE O/P NEW HI 60 MIN: CPT

## 2022-05-09 NOTE — HISTORY OF PRESENT ILLNESS
[FreeTextEntry1] : Rosalia Sparks is a 66-year-old female with obesity chronic insomnia Graves' disease who presents with at least 3 years of a chronic daily headache which occurs in the morning on awakening.  She takes 2 Excedrin every morning and gets improvement.  She also has chronic bilateral shoulder pain and takes Motrin 800 mg on the average of once daily.  She has had a trial of Ubrelvy 100 mg with some benefit and it was no help with Fioricet.  The headache is described as frontal and bitemporal and sharp and also pressure in quality.  The headache seems to have begun in the postmenopausal.  She describes menstrual headache occurring decades ago.  She denies any visual aura or other associated neurological symptoms. \par \par She has chronic insomnia.  Years ago she reports having a negative test for sleep apnea.\par \par She has had MRI scanning of the brain noncontrast performed on 8/9/2021 and 7/18/2016 at the [facility] radiology group.  She has stable prominence to CSF spaces overlying the frontal lobe convexities.  There was no mass-effect.  There were no acute findings and there was no change in the images.\par \par Cervical MRI performed on 3/25/2022 reveals bulging and herniated disks and degenerative change and T1 vertebral body intraosseous cyst.\par \par She has had episodes of head trauma in the past.\par \par There is a past history of Graves' disease.  She was evaluated by hematology for an elevated hematocrit with a negative work-up.\par \par Medication includes Motrin 800 mg daily as needed and Excedrin 2 tablets in the a.m. daily aspirin and she stopped famotidine since this seemed to exacerbate her headache.  She also receives atenolol for palpitations and possibly some mild hypertension.\par \par

## 2022-05-09 NOTE — CONSULT LETTER
[Dear  ___] : Dear  [unfilled], [Consult Letter:] : I had the pleasure of evaluating your patient, [unfilled]. [Please see my note below.] : Please see my note below. [Consult Closing:] : Thank you very much for allowing me to participate in the care of this patient.  If you have any questions, please do not hesitate to contact me. [Sincerely,] : Sincerely, [FreeTextEntry3] : Stanislaw Rushing MD\par

## 2022-05-09 NOTE — ASSESSMENT
[FreeTextEntry1] : Impression: This 66-year-old female patient with history of obesity Graves' disease chronic insomnia presents with a chronic history of daily headache which occurs in the morning on awakening.  She has been evaluated for sleep apnea several years ago and she describes a negative test.  There is the daily frequent use of Excedrin which she takes each morning.  There is probably a component of medication overuse headache.  Rule out sleep apnea in spite of the prior history of having a negative test.  Brain MRI revealing stable prominent CSF spaces overlying the frontal convexities is considered clinically insignificant finding and not associated with headache.  Cervical MRI revealing disc herniations also probably not going to be related to her complaint of headache.\par \par Recommendations: Topiramate 25 mg at bedtime for the first 1 week and then increase to 50 mg at bedtime.  Naproxen 500 mg twice daily as needed to replace Motrin and possibly replace Excedrin.  Referral to Dr. Ramírez for chronic insomnia and possible sleep apnea with early morning headache.  Office follow-up as directed.\par

## 2022-05-09 NOTE — PHYSICAL EXAM
[FreeTextEntry1] : Head:  Normocephalic Neck: Supple nontender no carotid bruits.  \par \par Mental Status:  Alert Oriented X3 Speech normal and no aphasia or dysarthria.\par \par Cranial Nerves:  PERRL, Fundi normal Visual Fields full  EOMI no diplopia no ptosis no nystagmus, V through XII intact.\par \par Motor:  No drift, normal strength tone and coordination and no focal atrophy. No abnormal movements. No dysmetria.  Normal rapid alternating movements.  Reduced range of motion both shoulders.\par \par DTRs: Symmetric and 2+.  Plantars flexor.  No Clonus.\par \par Sensory:  Normal testing with pin light touch and vibration and  Joint position sense.  Normal DSS to touch.\par \par Gait:  Normal including tandem walking heel toe walking and Rhomberg.\par

## 2022-05-13 ENCOUNTER — APPOINTMENT (OUTPATIENT)
Dept: PULMONOLOGY | Facility: CLINIC | Age: 67
End: 2022-05-13
Payer: COMMERCIAL

## 2022-05-13 VITALS
HEART RATE: 68 BPM | HEIGHT: 63 IN | WEIGHT: 198 LBS | TEMPERATURE: 98.2 F | BODY MASS INDEX: 35.08 KG/M2 | DIASTOLIC BLOOD PRESSURE: 78 MMHG | OXYGEN SATURATION: 97 % | SYSTOLIC BLOOD PRESSURE: 120 MMHG

## 2022-05-13 PROCEDURE — 99213 OFFICE O/P EST LOW 20 MIN: CPT

## 2022-05-13 NOTE — ASSESSMENT
[FreeTextEntry1] : With history of Graves' disease must also consider possibility of substernal thyroid with tracheal compression secondary to enlarged thyroid.  CT of chest ordered.  Pulmonary function testing reviewed with patient.  No significant obstruction noted.  Patient will follow up with local endocrinologist.\par Patient may also have asthmatic component.  Patient has been asked to restart Singulair and follow weights and blood pressure at home because of her concerns about sodium.

## 2022-05-13 NOTE — REVIEW OF SYSTEMS
[Wheezing] : wheezing [Negative] : Endocrine [TextBox_30] : Wheezing at night when she lays down and exercise intolerance [TextBox_94] : Shoulder tightness chronic

## 2022-05-13 NOTE — REASON FOR VISIT
[Follow-Up] : a follow-up visit [Shortness of Breath] : shortness of breath [Wheezing] : wheezing [Spouse] : spouse

## 2022-06-13 ENCOUNTER — APPOINTMENT (OUTPATIENT)
Dept: NEUROLOGY | Facility: CLINIC | Age: 67
End: 2022-06-13

## 2022-06-21 LAB
FT4I SERPL CALC-MCNC: 8.3 INDEX
T3FREE SERPL-MCNC: 3.26 PG/ML
TSH SERPL-ACNC: 1.42 UIU/ML

## 2022-06-22 ENCOUNTER — OUTPATIENT (OUTPATIENT)
Dept: OUTPATIENT SERVICES | Facility: HOSPITAL | Age: 67
LOS: 1 days | Discharge: HOME | End: 2022-06-22

## 2022-06-22 DIAGNOSIS — R79.9 ABNORMAL FINDING OF BLOOD CHEMISTRY, UNSPECIFIED: ICD-10-CM

## 2022-06-30 ENCOUNTER — APPOINTMENT (OUTPATIENT)
Dept: PULMONOLOGY | Facility: CLINIC | Age: 67
End: 2022-06-30

## 2022-06-30 VITALS
BODY MASS INDEX: 35.08 KG/M2 | OXYGEN SATURATION: 97 % | SYSTOLIC BLOOD PRESSURE: 120 MMHG | TEMPERATURE: 97.3 F | HEART RATE: 66 BPM | HEIGHT: 63 IN | DIASTOLIC BLOOD PRESSURE: 75 MMHG | WEIGHT: 198 LBS

## 2022-06-30 PROCEDURE — 99212 OFFICE O/P EST SF 10 MIN: CPT

## 2022-06-30 NOTE — REVIEW OF SYSTEMS
[Wheezing] : wheezing [Negative] : Endocrine [TextBox_30] : Occasional wheezing when walking [TextBox_94] : Shoulder tightness chronic

## 2022-07-01 ENCOUNTER — RESULT REVIEW (OUTPATIENT)
Age: 67
End: 2022-07-01

## 2022-07-01 ENCOUNTER — APPOINTMENT (OUTPATIENT)
Dept: HEMATOLOGY ONCOLOGY | Facility: CLINIC | Age: 67
End: 2022-07-01

## 2022-07-01 LAB
BASOPHILS # BLD AUTO: 0.09 K/UL — SIGNIFICANT CHANGE UP (ref 0–0.2)
BASOPHILS NFR BLD AUTO: 1.5 % — SIGNIFICANT CHANGE UP (ref 0–2)
EOSINOPHIL # BLD AUTO: 0.13 K/UL — SIGNIFICANT CHANGE UP (ref 0–0.5)
EOSINOPHIL NFR BLD AUTO: 2.2 % — SIGNIFICANT CHANGE UP (ref 0–6)
HCT VFR BLD CALC: 42.3 % — SIGNIFICANT CHANGE UP (ref 34.5–45)
HGB BLD-MCNC: 13.8 G/DL — SIGNIFICANT CHANGE UP (ref 11.5–15.5)
IMM GRANULOCYTES NFR BLD AUTO: 2 % — HIGH (ref 0–1.5)
LYMPHOCYTES # BLD AUTO: 1.89 K/UL — SIGNIFICANT CHANGE UP (ref 1–3.3)
LYMPHOCYTES # BLD AUTO: 32.2 % — SIGNIFICANT CHANGE UP (ref 13–44)
MCHC RBC-ENTMCNC: 29 PG — SIGNIFICANT CHANGE UP (ref 27–34)
MCHC RBC-ENTMCNC: 32.6 G/DL — SIGNIFICANT CHANGE UP (ref 32–36)
MCV RBC AUTO: 88.9 FL — SIGNIFICANT CHANGE UP (ref 80–100)
MONOCYTES # BLD AUTO: 0.56 K/UL — SIGNIFICANT CHANGE UP (ref 0–0.9)
MONOCYTES NFR BLD AUTO: 9.5 % — SIGNIFICANT CHANGE UP (ref 2–14)
NEUTROPHILS # BLD AUTO: 3.08 K/UL — SIGNIFICANT CHANGE UP (ref 1.8–7.4)
NEUTROPHILS NFR BLD AUTO: 52.6 % — SIGNIFICANT CHANGE UP (ref 43–77)
NRBC # BLD: 0 /100 WBCS — SIGNIFICANT CHANGE UP (ref 0–0)
PLATELET # BLD AUTO: 168 K/UL — SIGNIFICANT CHANGE UP (ref 150–400)
RBC # BLD: 4.76 M/UL — SIGNIFICANT CHANGE UP (ref 3.8–5.2)
RBC # FLD: 12.6 % — SIGNIFICANT CHANGE UP (ref 10.3–14.5)
WBC # BLD: 5.87 K/UL — SIGNIFICANT CHANGE UP (ref 3.8–10.5)
WBC # FLD AUTO: 5.87 K/UL — SIGNIFICANT CHANGE UP (ref 3.8–10.5)

## 2022-07-05 ENCOUNTER — APPOINTMENT (OUTPATIENT)
Dept: HEMATOLOGY ONCOLOGY | Facility: CLINIC | Age: 67
End: 2022-07-05

## 2022-07-05 PROCEDURE — 99442: CPT

## 2022-07-05 RX ORDER — LIDOCAINE 5% 700 MG/1
5 PATCH TOPICAL
Qty: 30 | Refills: 0 | Status: ACTIVE | COMMUNITY
Start: 2022-07-01

## 2022-07-05 NOTE — CONSULT LETTER
[Dear  ___] : Dear  [unfilled], [Courtesy Letter:] : I had the pleasure of seeing your patient, [unfilled], in my office today. [Please see my note below.] : Please see my note below. [Consult Closing:] : Thank you very much for allowing me to participate in the care of this patient.  If you have any questions, please do not hesitate to contact me. [Sincerely,] : Sincerely, [FreeTextEntry3] : May Joyce MD

## 2022-07-05 NOTE — REASON FOR VISIT
[Patient] : the patient [Self] : self [Follow-Up Visit] : a follow-up visit for [Home] : at home, [unfilled] , at the time of the visit. [Medical Office: ([facility])___] : at the medical office located in  [Verbal consent obtained from patient] : the patient, [unfilled] [FreeTextEntry2] : elevated hematocrit

## 2022-07-05 NOTE — HISTORY OF PRESENT ILLNESS
[de-identified] : 12/1/21-Patient presented at the request of her PCP for an elevated Hct. found on routine lab work. Erythropoietin level and TUTU 2 mutation studies WNL.\par \par \par  [de-identified] : A couple of months ago, noticed left nipple retraction. Had breast MRI ([facility]) which reportedly showed a small nodule; and then saw Dr. Thompson with excisional biopsy planned.\par Seeing Dr. Bennett for cardiology clearance prior to breast lumpectomy.\par S/P COVID infection 1 month ago.\par No current pulmonary/GI//bony/neuro complaints at this time. No fevers. No H/A. No h/o abnormal bleeding.\par Sees Dr. Balderas (endocrine) for hyperthyroidism.\par \par Had chosen not to get COVID vaccines.

## 2022-07-05 NOTE — ASSESSMENT
[FreeTextEntry1] : CBC reviewed.\par h/o Mildly elevated hematocrit–Erythropoietin level WNL and TUTU 2 mutation studies normal 12/2021. Most recent H/H normal. Currently clinically suspect secondary/reactive process-?hemoconcentration with daily diuretic use.\par P.O. hydration as tolerated. Hematologic surveillance for now.\par \par h/o Mild thrombocytopenia-last platelet count WNL.\par \par 2% imm. gran. noted-?recent antecedent COVID infection contributed. interval f/u CBC with diff. to be done.\par \par Should hematologic scenario worsen/change, can decide if prudent to pursue BMB to rule out underlying evolving BM/MPD. \par \par Patient was given the opportunity to ask questions.  Her questions have been answered to her apparent satisfaction at this time.  She expressed her understanding and willingness to comply with recommended follow-up.\par

## 2022-08-15 ENCOUNTER — APPOINTMENT (OUTPATIENT)
Dept: ENDOCRINOLOGY | Facility: CLINIC | Age: 67
End: 2022-08-15

## 2022-08-23 ENCOUNTER — APPOINTMENT (OUTPATIENT)
Dept: NEUROLOGY | Facility: CLINIC | Age: 67
End: 2022-08-23

## 2022-08-23 VITALS
SYSTOLIC BLOOD PRESSURE: 118 MMHG | HEIGHT: 63 IN | HEART RATE: 70 BPM | BODY MASS INDEX: 35.08 KG/M2 | DIASTOLIC BLOOD PRESSURE: 80 MMHG | WEIGHT: 198 LBS

## 2022-08-23 PROCEDURE — 99214 OFFICE O/P EST MOD 30 MIN: CPT

## 2022-08-23 NOTE — ASSESSMENT
[FreeTextEntry1] : Impression: This 66-year-old female with a history of Graves' disease obesity chronic insomnia and several year history of chronic daily early morning headache for which she takes Excedrin Migraine.  Rule out sleep apnea.  Rule out medication overuse headache regarding daily Excedrin.  She describes side effects from topiramate which was stopped after only few doses.  She did not try naproxen in place of the Excedrin.  There was no significant benefit with Ubrelvy which she only takes rarely.\par \par Recommendations: Begin a trial of nortriptyline 10 mg once at bedtime.  Patient will call in 1 week regarding her condition and response to medication.  It was again suggested that she pursue an evaluation for sleep apnea.  Potential side effects of nortriptyline have been discussed with the patient including dry mouth dizziness and increase in appetite.  Office follow-up as directed\par

## 2022-08-23 NOTE — PHYSICAL EXAM
[FreeTextEntry1] : Head:  Normocephalic Neck: Supple nontender no carotid bruits. \par \par Mental Status:  Alert Oriented X3 Speech normal and no aphasia or dysarthria.\par \par Cranial Nerves:  PERRL, Visual Fields full  EOMI no diplopia no ptosis no nystagmus, V through XII intact.\par \par Motor:  No drift, normal strength tone and coordination and no focal atrophy. No abnormal movements. No dysmetria.  Normal rapid alternating movements. \par \par DTRs: Symmetric and 2+.  Plantars flexor.  No Clonus.\par \par Sensory:  Normal testing with pin light touch and no lateralizing findings.\par \par Gait:  Normal including tandem walking heel toe walking and Rhomberg.\par

## 2022-08-23 NOTE — HISTORY OF PRESENT ILLNESS
[FreeTextEntry1] : Rosalia Sparks is seen for an office visit.  She was last seen by me on 5/9/2022.  At that time she was given topiramate 25 mg to take at bedtime.  After 3 doses she stopped the medication.  She states that the medication had side effects and was affecting her eyes.  She continues to take 2 Excedrin Migraine each morning for daily early morning headache on awakening.  The headache is a bifrontal.  She has a chronic history in this regard.  When she stops the Excedrin headache can be quite severe.  She will occasionally use Ubrelvy 100 mg without much benefit.  In the past she took Fioricet.  MRI of the brain has been negative.  \par \par She has chronic insomnia and that at the time of the last visit she was referred for sleep apnea evaluation which did she did not pursue

## 2022-08-25 ENCOUNTER — APPOINTMENT (OUTPATIENT)
Dept: PULMONOLOGY | Facility: CLINIC | Age: 67
End: 2022-08-25

## 2022-08-25 VITALS
TEMPERATURE: 97.8 F | OXYGEN SATURATION: 96 % | DIASTOLIC BLOOD PRESSURE: 80 MMHG | HEIGHT: 63 IN | SYSTOLIC BLOOD PRESSURE: 122 MMHG | WEIGHT: 205 LBS | BODY MASS INDEX: 36.32 KG/M2 | HEART RATE: 81 BPM

## 2022-08-25 PROCEDURE — 99212 OFFICE O/P EST SF 10 MIN: CPT

## 2022-08-25 RX ORDER — NORTRIPTYLINE HYDROCHLORIDE 10 MG/1
10 CAPSULE ORAL
Qty: 30 | Refills: 2 | Status: DISCONTINUED | COMMUNITY
Start: 2022-08-23 | End: 2022-08-25

## 2022-08-25 RX ORDER — TOPIRAMATE 25 MG/1
25 TABLET, FILM COATED ORAL
Qty: 60 | Refills: 2 | Status: DISCONTINUED | COMMUNITY
Start: 2022-05-09 | End: 2022-08-25

## 2022-08-25 NOTE — REVIEW OF SYSTEMS
[Cough] : no cough [Sputum] : no sputum [Wheezing] : no wheezing [Negative] : Endocrine [TextBox_30] : No wheezing since last visit [TextBox_94] : Shoulder tightness chronic

## 2022-10-07 ENCOUNTER — APPOINTMENT (OUTPATIENT)
Dept: OTOLARYNGOLOGY | Facility: CLINIC | Age: 67
End: 2022-10-07

## 2022-10-14 ENCOUNTER — APPOINTMENT (OUTPATIENT)
Dept: OTOLARYNGOLOGY | Facility: CLINIC | Age: 67
End: 2022-10-14

## 2022-10-20 NOTE — ED ADULT TRIAGE NOTE - MODE OF ARRIVAL
Progress Note  Alert, oriented, in her room.  New today- reviewed meds, competency so she could consent to dialysis catheter removal. She is deemed competent.   -She is taking care of paper work such as will and property management. Her brother and friend are helping her.     Chief Complaint:  Admitted with hypotension and shock. DREA was on Replacement therapy.     Now she appears to have regained renal function and dialysis catherter out.       ROS:  CONSTITUTIONAL: Denies pain and shortness of breath. In bed, comfortable. Has some fatigue,no fever.no  headaches  EYES:  No change in vision  ENT/o/p: No thyromegaly, No JVD  CV:  No chest pain,  palpitations  RESPIRATORY: No SOB  GI:   No NVDC  : No dysuria  MSK: No new joint pains  SKIN:  No new rashes  NEURO:  No new focal deficits  PSYCH: Not anxious  ENDOCRINE:  Diabetes  HEME/LYMPH:  Anemia, Long term anticoag therapy  ALLERGY/IMMUN: As listed    Consultant(s):  intensivist- now in step down, have signed off.   General surgery, cardiology, renal, hematology, ID.     Subjective:  Alert, oriented, responds appropriately. Participated in physical therapy.     Current Medications:  Current Facility-Administered Medications   Medication Dose Route Frequency Provider Last Rate Last Admin   • metoCLOPramide (REGLAN) tablet 5 mg  5 mg Oral Q6H PRN Rajat Craig MD       • pantoprazole (PROTONIX) EC tablet 40 mg  40 mg Oral BID PRN Rajat Craig MD       • heparin (porcine) 25,000 units/250 mL in dextrose 5 % infusion  1-30 Units/kg/hr (Dosing Weight) Intravenous Continuous Edilia Hyatt MD 15 mL/hr at 10/20/22 1410 11 Units/kg/hr at 10/20/22 1410   • folic acid (FOLATE) tablet 1 mg  1 mg Oral Daily Edilia Hyatt MD   1 mg at 10/18/22 0941   • acetaminophen (TYLENOL) tablet 500 mg  500 mg Oral Q6H PRN Rajat Craig MD   500 mg at 10/11/22 1826   • traMADol (ULTRAM) tablet 50 mg  50 mg Oral Q6H PRN Rajat Craig MD   50 mg  at 10/18/22 0500   • [Held by provider] metoPROLOL tartrate (LOPRESSOR) tablet 25 mg  25 mg Oral 2 times per day Rajat Craig MD   25 mg at 10/11/22 2218   • docusate sodium-sennosides (SENOKOT S) 50-8.6 MG 1 tablet  1 tablet Oral BID Rajat Craig MD   1 tablet at 10/20/22 0901   • midodrine (PROAMATINE) tablet 10 mg  10 mg Oral 3 times per day Ghassan Velez MD   10 mg at 10/19/22 1611   • ondansetron (ZOFRAN) injection 4 mg  4 mg Intravenous Q6H PRN Rajat Craig MD   4 mg at 10/15/22 2058   • brimonidine (ALPHAGAN) 0.2 % ophthalmic solution 1 drop  1 drop Both Eyes BID Rajat Craig MD   1 drop at 10/20/22 0907   • dorzolamide-timolol (COSOPT) 22.3-6.8 MG/ML ophthalmic solution 1 drop  1 drop Both Eyes BID Rajat Craig MD   1 drop at 10/20/22 0902   • insulin lispro (ADMELOG,HumaLOG) - Correction Dose   Subcutaneous 4x Daily WC Victor Manuel Colin MD   6 Units at 10/20/22 1421   • bisacodyl (DULCOLAX) suppository 10 mg  10 mg Rectal Daily PRN Emilee Sandoval NP   10 mg at 10/02/22 1004   • polyethylene glycol (MIRALAX) packet 17 g  17 g Oral Daily PRN Emilee Sandoval NP   17 g at 10/03/22 0922   • labetalol (NORMODYNE) injection 20 mg  20 mg Intravenous Q1H PRN Victor Manuel Colin MD   20 mg at 10/02/22 1510   • sodium chloride 0.9 % flush bag 25 mL  25 mL Intravenous PRN Brenda Conklin NP 50 mL/hr at 10/12/22 2308 25 mL at 10/12/22 2308   • sodium chloride (PF) 0.9 % injection 2 mL  2 mL Intracatheter 2 times per day Brenda Conklin NP   2 mL at 10/20/22 0901   • sodium chloride 0.9% infusion   Intravenous Continuous PRN Brenda Conklin NP       • sodium chloride 0.9% infusion   Intravenous Continuous PRN Brenda Conklin NP       • dextrose 50 % injection 25 g  25 g Intravenous PRN Brenda Conklin NP       • dextrose 50 % injection 12.5 g  12.5 g Intravenous PRN Brenda Conklin NP       • glucagon (GLUCAGEN) injection 1 mg  1 mg Intramuscular PRN Brenda  ANTWON Conklin NP       • dextrose (GLUTOSE) 40 % gel 15 g  15 g Oral PRN Brenda Conklin NP       • dextrose (GLUTOSE) 40 % gel 30 g  30 g Oral PRN Brenda Conklin NP            Physical Exam  Temp:  [97.5 °F (36.4 °C)-99.3 °F (37.4 °C)] 99.3 °F (37.4 °C)  Heart Rate:  [66-88] 85  Resp:  [16-20] 16  BP: ()/(53-77) 134/64  Physical Exam  Tele NSR, no new arrhythmia. Monitored based on clinical cardiac conditions. Rate 58-72 range, controlled and stable.   HEENT- NG is out. NO JVD.  Card- S1, S2, tele reviewed thru out her stay. She is in sinus rhythm. Monitored based on heart conditions.   Lungs- Clear, no rales.   Abdomen- obese, not tender, bowel sounds heard.   Extr- has chronic stasis and edema, no cellulitis.   Skin no rashes  Neuro-  alert, oriented and moves all four extr. She participated in physical therapy. She is not able to stand yet.   Labs  Recent Results (from the past 24 hour(s))   GLUCOSE, BEDSIDE - POINT OF CARE    Collection Time: 10/19/22  4:48 PM   Result Value Ref Range    GLUCOSE, BEDSIDE - POINT OF CARE 198 (H) 70 - 99 mg/dL   GLUCOSE, BEDSIDE - POINT OF CARE    Collection Time: 10/19/22  9:28 PM   Result Value Ref Range    GLUCOSE, BEDSIDE - POINT OF CARE 159 (H) 70 - 99 mg/dL   Partial Thromboplastin Time    Collection Time: 10/19/22 10:13 PM   Result Value Ref Range    PTT 55 (H) 22 - 30 sec   Partial Thromboplastin Time    Collection Time: 10/20/22  5:51 AM   Result Value Ref Range    PTT 66 (H) 22 - 30 sec   Basic Metabolic Panel    Collection Time: 10/20/22  5:51 AM   Result Value Ref Range    Fasting Status      Sodium 140 135 - 145 mmol/L    Potassium 4.2 3.4 - 5.1 mmol/L    Chloride 103 97 - 110 mmol/L    Carbon Dioxide 31 21 - 32 mmol/L    Anion Gap 10 7 - 19 mmol/L    Glucose 152 (H) 70 - 99 mg/dL    BUN 44 (H) 6 - 20 mg/dL    Creatinine 2.67 (H) 0.51 - 0.95 mg/dL    Glomerular Filtration Rate 19 (L) >=60    BUN/ Creatinine Ratio 16 7 - 25    Calcium 9.1 8.4 - 10.2 mg/dL   CBC  No Differential    Collection Time: 10/20/22  5:51 AM   Result Value Ref Range    WBC 7.0 4.2 - 11.0 K/mcL    RBC 2.48 (L) 4.00 - 5.20 mil/mcL    HGB 7.5 (L) 12.0 - 15.5 g/dL    HCT 24.0 (L) 36.0 - 46.5 %    MCV 96.8 78.0 - 100.0 fl    MCH 30.2 26.0 - 34.0 pg    MCHC 31.3 (L) 32.0 - 36.5 g/dL     140 - 450 K/mcL    RDW-CV 16.5 (H) 11.0 - 15.0 %    RDW-SD 58.1 (H) 39.0 - 50.0 fL    NRBC 0 <=0 /100 WBC   GLUCOSE, BEDSIDE - POINT OF CARE    Collection Time: 10/20/22  8:18 AM   Result Value Ref Range    GLUCOSE, BEDSIDE - POINT OF CARE 145 (H) 70 - 99 mg/dL   GLUCOSE, BEDSIDE - POINT OF CARE    Collection Time: 10/20/22 11:35 AM   Result Value Ref Range    GLUCOSE, BEDSIDE - POINT OF CARE 215 (H) 70 - 99 mg/dL         Assessment and Plan:    1. Hypotension and shock, no evidence of acute infection, acute blood loss due to retroperitoneal bleed. Now improved.   2. DREA- was on dialysis now on hold- has urinated. Await repeat renal evaluation and blood work. RENAL FUNCTION APPEARS TO BE IMPROVING.   3. Acute blood loss anemia, Retro peritoneal bleed.Heparin stopped due to recurrent bleed. Await consultant's opinions regarding when to resume. Discussed with cardiology service.  REVIEWED HEMATOLOGY AND CARDIOLOGY CONSULT. SHE IS THINKING ABOUT HER OPTIONS OF NOT GOING ON ANTICOAGULATION. SHE IS ALSO CONSIDERING TRANSFER TO [facility] AS THAT IS WHERE SHE HAD HER AORTIC SURGERY. This issue was addressed previously.     4. ANOTHER OPTION IS TO TRANSFUSE HER HERE AND RESTART HEPARIN. WILL DISCUSS WITH HEMATOLOGY. Now on iv heparin and monitor for any signs of bleeding. This is now addressed.   5. Leucocytosis and Thrombocytopenia- hematology following. This has improved. This has stabilized and normalized platelets.   6. Diabetes- on insulin sliding scale.   7. HTN history  8. Morbid obesity  9. Glaucoma- on drops.   10. Chronic pain history. Now on Tylenol.   11. AVR- on long term anticoag, now on hold. Cardiology,  surgery and hematology following.   12. Drowsiness- resolved, she is at baseline.   13. Gastric Stasis resolved.     Awaiting discharge plan- to ecf of choice.       Patient Active Problem List:     Type 2 diabetes mellitus (CMS/HCC)[E11.9]      Priority: Low [3]      Date Noted: 06/17/2020      Comment: 09/20/2022, Formatting of this note is                different from the original.  Lab Results                  Component Value Date    HGBA1C 10.5 (H)                06/16/2020   Maintained on Levemir and mealtime               Humalog.      Comment: 09/20/2022, Formatting of this note is                different from the original.  Lab Results                  Component Value Date    HGBA1C 10.5 (H)                06/16/2020   Maintained on Levemir and mealtime               Humalog.    Presence of prosthetic heart valve[Z95.2]      Priority: Low [3]      Date Noted: 06/23/2020      Comment: 09/20/2022, Formatting of this note might be                different from the original.  Aortic valve                replacement with On-X 21-mm mechanical                prosthesis 6/16/2020 with Dr. Murguia.      Comment: 09/20/2022, Formatting of this note might be                different from the original.  Aortic valve                replacement with On-X 21-mm mechanical                prosthesis 6/16/2020 with Dr. Murguia.    Pleural effusion[J90]      Priority: Low [3]      Date Noted: 06/24/2020    Personal history of pulmonary embolism[Z86.711]      Priority: Low [3]      Date Noted: 06/15/2020    Personal history of other venous thrombosis and embolism[Z86.718]      Priority: Low [3]      Date Noted: 06/15/2020    Pancreatic cyst[K86.2]      Priority: Low [3]      Date Noted: 09/13/2021    Other specified postprocedural states[Z98.890]      Priority: Low [3]      Date Noted: 06/23/2020      Comment: 09/20/2022, Formatting of this note might be                different from the original.  6/16/2020      Comment:  09/20/2022, Formatting of this note might be                different from the original.  6/16/2020    Other specified postprocedural states[Z98.890]      Priority: Low [3]      Date Noted: 06/17/2020      Comment: 09/20/2022, Formatting of this note might be                different from the original.  6/16/2020 AVR                (On-X 21 mm), posterior root enlargement with                pericardial patch, subaortic membrane                resection, septal myectomy, MV repair                (papillary muscle repositioning) with Dr. Murguia      Comment: 09/20/2022, Formatting of this note might be                different from the original.  6/16/2020 AVR                (On-X 21 mm), posterior root enlargement with                pericardial patch, subaortic membrane                resection, septal myectomy, MV repair                (papillary muscle repositioning) with Dr. Murguia    Osteoarthrosis[M19.90]      Priority: Low [3]      Date Noted: 06/23/2014    Obstructive sleep apnea syndrome[G47.33]      Priority: Low [3]      Date Noted: 04/03/2017    Morbid obesity with body mass index of 45.0-49.9 in adult (CMS/[facility])[E66.01, Z68.42]      Priority: Low [3]      Date Noted: 09/13/2021    Mechanical heart valve present[Z95.2]      Priority: Low [3]      Date Noted: 09/13/2021    Hypotension[I95.9]      Priority: Low [3]      Date Noted: 06/23/2020      Comment: 09/20/2022, Formatting of this note is                different from the original.  Patient has had                difficulties with hypotension since her                hospitalization. She is currently on Midodrine                5 mg BID. In addition, she has had lower                extremity edema for which she is on Lasix 40 mg               twice daily. Prior to hospitalization, she was                only on 20 mg daily. She continues to have                lower extremity edema and does have low stretch                wraps to lower extremities                bilaterally     Blood pressures have remained                stable since her admissions at the SNF.                However, she continues to have 2+ edema to the                LLE. She is requesting prescription for support               stocking before she goes home.     Last                Assessment & Plan:   Formatting of this note is               different from the original.  Wt Readings from                Last 6 Encounters:   07/08/20 132 kg   07/01/20               135 kg   06/29/20 135 kg   06/26/20 135 kg                  06/25/20 135 kg   03/19/20 135 kg     BP                Readings from Last 3 Encounters:   07/08/20                127/71   07/01/20 (!) 106/52   06/29/20 112/62                    -continue Lasix 40 mg BID  -continue                Midodrine 5 mg BID  -daily w      Comment: 09/20/2022, Formatting of this note is                different from the original.  Patient has had                difficulties with hypotension since her                hospitalization. She is currently on Midodrine                5 mg BID. In addition, she has had lower                extremity edema for which she is on Lasix 40 mg               twice daily. Prior to hospitalization, she was                only on 20 mg daily. She continues to have                lower extremity edema and does have low stretch               wraps to lower extremities                bilaterally     Blood pressures have remained                stable since her admissions at the SNF.                However, she continues to have 2+ edema to the                LLE. She is requesting prescription for support               stocking before she goes home.     Last                Assessment & Plan:   Formatting of this note is               different from the original.  Wt Readings from                Last 6 Encounters:   07/08/20 132 kg   07/01/20               135 kg   06/29/20 135 kg    06/26/20 135 kg                  06/25/20 135 kg   03/19/20 135 kg     BP                Readings from Last 3 Encounters:   07/08/20                127/71   07/01/20 (!) 106/52   06/29/20 112/62                    -continue Lasix 40 mg BID  -continue                Midodrine 5 mg BID  -daily w    Hypertension[I10]      Priority: Low [3]      Date Noted: 09/13/2021    Hypocalcemia[E83.51]      Priority: Low [3]      Date Noted: 06/23/2020    Hyperlipidemia[E78.5]      Priority: Low [3]      Date Noted: 06/15/2020      Comment: 09/20/2022, Formatting of this note might be                different from the original.  Maintained on                atorvastatin      Comment: 09/20/2022, Formatting of this note might be                different from the original.  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  After [facility] POD                6/17/2020.  Chemically converted after one                bolus.      Comment: 09/20/2022, Formatting of this note might be                different from the original.  After [facility] POD                6/17/2020.  Chemically converted after one                bolus.    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]      Priority: Low [3]      Date Noted: 09/20/2022    Heart murmur[R01.1]      Priority: Low [3]      Date Noted: 09/20/2022    History of blood transfusion[Z92.89]      Priority: Low [3]      Date Noted: 09/20/2022    Glaucoma[H40.9]      Priority: Low [3]      Date Noted: 10/20/2016    Endometriosis[N80.9]      Priority: Low [3]      Date Noted: 09/20/2022    Pancreas cyst[K86.2]      Priority: Low [3]      Date Noted: 09/20/2022    Chronic pain[G89.29]      Priority: Low [3]      Date Noted: 09/20/2022    Aortic stenosis[I35.0]      Priority: Low [3]      Date Noted: 09/20/2022    Left foot pain[M79.672]      Priority: Low [3]      Date Noted: 09/20/2022    Septic shock (CMS/HCC)[A41.9, R65.21]      Priority: Low [3]      Date Noted: 09/30/2022         DVT PPX:  Had recurrent bleeding with anticoagulation. Now warfarin is on hold. She was restarted on heparin- as noted in hematology oncology consult. She is at risk for bleeding.     Consults reviewed.     Prognosis guarded due to multiple co-morbidities that are pre-existing.      Code Status:    Code Status Information     Code Status    Full Resuscitation           Dispo:  Stepdown.     ADOD:  TBD. Gradually improving. Needs anticoagulation issues resolved. Now on heparin. Discharge plan when all agree.     Rajat Craig MD   10/20/2022   Walk in

## 2022-10-28 ENCOUNTER — APPOINTMENT (OUTPATIENT)
Dept: OTOLARYNGOLOGY | Facility: CLINIC | Age: 67
End: 2022-10-28

## 2022-11-11 ENCOUNTER — APPOINTMENT (OUTPATIENT)
Dept: OTOLARYNGOLOGY | Facility: CLINIC | Age: 67
End: 2022-11-11

## 2022-11-29 ENCOUNTER — APPOINTMENT (OUTPATIENT)
Dept: PULMONOLOGY | Facility: CLINIC | Age: 67
End: 2022-11-29

## 2022-11-29 VITALS
HEIGHT: 63 IN | WEIGHT: 229 LBS | TEMPERATURE: 97.2 F | BODY MASS INDEX: 40.57 KG/M2 | OXYGEN SATURATION: 95 % | HEART RATE: 60 BPM | SYSTOLIC BLOOD PRESSURE: 120 MMHG | DIASTOLIC BLOOD PRESSURE: 75 MMHG

## 2022-11-29 PROCEDURE — 99213 OFFICE O/P EST LOW 20 MIN: CPT

## 2022-12-09 NOTE — REVIEW OF SYSTEMS
[Dyspnea] : dyspnea [Wheezing] : wheezing [SOB on Exertion] : sob on exertion [Negative] : Endocrine [Cough] : no cough [Sputum] : no sputum [TextBox_94] : Shoulder tightness chronic

## 2022-12-09 NOTE — PHYSICAL EXAM
[No Acute Distress] : no acute distress [Normal Oropharynx] : normal oropharynx [Normal Appearance] : normal appearance [No Neck Mass] : no neck mass [Normal Rate/Rhythm] : normal rate/rhythm [Normal S1, S2] : normal s1, s2 [No Murmurs] : no murmurs [No Resp Distress] : no resp distress [No Abnormalities] : no abnormalities [Benign] : benign [Normal Gait] : normal gait [No Clubbing] : no clubbing [No Cyanosis] : no cyanosis [No Edema] : no edema [FROM] : FROM [Normal Color/ Pigmentation] : normal color/ pigmentation [No Focal Deficits] : no focal deficits [Oriented x3] : oriented x3 [Normal Affect] : normal affect [TextBox_68] : Scattered expiratory wheezing

## 2022-12-26 ENCOUNTER — OUTPATIENT (OUTPATIENT)
Dept: OUTPATIENT SERVICES | Facility: HOSPITAL | Age: 67
LOS: 1 days | Discharge: HOME | End: 2022-12-26

## 2022-12-26 DIAGNOSIS — R79.9 ABNORMAL FINDING OF BLOOD CHEMISTRY, UNSPECIFIED: ICD-10-CM

## 2023-01-03 ENCOUNTER — APPOINTMENT (OUTPATIENT)
Dept: HEMATOLOGY ONCOLOGY | Facility: CLINIC | Age: 68
End: 2023-01-03

## 2023-01-03 ENCOUNTER — APPOINTMENT (OUTPATIENT)
Dept: ENDOCRINOLOGY | Facility: CLINIC | Age: 68
End: 2023-01-03
Payer: COMMERCIAL

## 2023-01-03 VITALS
TEMPERATURE: 98.2 F | HEART RATE: 78 BPM | OXYGEN SATURATION: 96 % | BODY MASS INDEX: 42.52 KG/M2 | HEIGHT: 63 IN | DIASTOLIC BLOOD PRESSURE: 86 MMHG | WEIGHT: 240 LBS | SYSTOLIC BLOOD PRESSURE: 140 MMHG | RESPIRATION RATE: 16 BRPM

## 2023-01-03 DIAGNOSIS — E04.1 NONTOXIC SINGLE THYROID NODULE: ICD-10-CM

## 2023-01-03 DIAGNOSIS — E05.00 THYROTOXICOSIS WITH DIFFUSE GOITER W/OUT THYROTOXIC CRISIS OR STORM: ICD-10-CM

## 2023-01-03 PROCEDURE — 99204 OFFICE O/P NEW MOD 45 MIN: CPT

## 2023-01-03 NOTE — REVIEW OF SYSTEMS
[Fatigue] : no fatigue [Decreased Appetite] : appetite not decreased [Recent Weight Gain (___ Lbs)] : no recent weight gain [Recent Weight Loss (___ Lbs)] : no recent weight loss [Visual Field Defect] : no visual field defect [Dry Eyes] : no dryness [Dysphagia] : no dysphagia [Neck Pain] : no neck pain [Dysphonia] : no dysphonia [Nasal Congestion] : no nasal congestion [Chest Pain] : no chest pain [Slow Heart Rate] : heart rate is not slow [Palpitations] : no palpitations [Fast Heart Rate] : heart rate is not fast [Shortness Of Breath] : no shortness of breath [Cough] : no cough [Nausea] : no nausea [Constipation] : no constipation [Vomiting] : no vomiting [Diarrhea] : no diarrhea [Polyuria] : no polyuria [Irregular Menses] : regular menses [Joint Pain] : no joint pain [Muscle Weakness] : no muscle weakness [Acanthosis] : no acanthosis  [Headaches] : no headaches [Dizziness] : no dizziness [Tremors] : no tremors [Pain/Numbness of Digits] : no pain/numbness of digits [Polydipsia] : no polydipsia [Cold Intolerance] : no cold intolerance [Easy Bleeding] : no ~M tendency for easy bleeding [Easy Bruising] : no tendency for easy bruising

## 2023-01-03 NOTE — ASSESSMENT
[FreeTextEntry1] : Patient is a 67-year-old woman here to to follow-up for Graves' disease. Last seen in March 2019.\par \par \par Graves' disease\par TSH, TSH receptor antibody and TSI check in November 2018 were within normal limits.\par Currently not on any thyroid medication\par We will repeat thyroid function level along with TSH receptor antibody and TSI\par \par Obesity\par Will be following with dietician \par Will prescribe ozempic 0.25 mcg \par \par Colloid cyst\par 3 mm right colloid cyst on the last ultrasound October 13, 2020.\par Most recent US was normal \par \par Follow up in 3-4 months

## 2023-01-03 NOTE — HISTORY OF PRESENT ILLNESS
[FreeTextEntry1] : 67 year old female here for evaluation of Graves disease \par \par About 5-6 years ago, she had a fall in her house. She had a concussion and she was diagnosed with Graves' disease based on the MRI imaging. She saw two endocrinologist since and she was told that thyroid functions had been normal. She had the Graves' antibodies but no treatment was indicated. Since then, she never had a problem with her thyroid function tests. \par \par \par Patient reported that she has a low appetite \par Increased swelling in her hands \par Most recent TFTs were wnl \par \par \par The patient endorses the following eye related symptoms, she was diagnosed with Graves orbitopathy. She follows up with Dr. White from [facility]. \par \par \par BMI of 42 \par Diet: No juices or soda \par Occasional diet soda \par Does have cake and cookies \par

## 2023-01-06 ENCOUNTER — RX RENEWAL (OUTPATIENT)
Age: 68
End: 2023-01-06

## 2023-01-06 ENCOUNTER — APPOINTMENT (OUTPATIENT)
Dept: OTOLARYNGOLOGY | Facility: CLINIC | Age: 68
End: 2023-01-06
Payer: COMMERCIAL

## 2023-01-06 VITALS
BODY MASS INDEX: 42.52 KG/M2 | OXYGEN SATURATION: 96 % | SYSTOLIC BLOOD PRESSURE: 136 MMHG | TEMPERATURE: 98 F | HEART RATE: 57 BPM | HEIGHT: 63 IN | DIASTOLIC BLOOD PRESSURE: 82 MMHG | WEIGHT: 240 LBS | RESPIRATION RATE: 16 BRPM

## 2023-01-06 DIAGNOSIS — J34.3 HYPERTROPHY OF NASAL TURBINATES: ICD-10-CM

## 2023-01-06 DIAGNOSIS — J32.9 CHRONIC SINUSITIS, UNSPECIFIED: ICD-10-CM

## 2023-01-06 PROCEDURE — 99204 OFFICE O/P NEW MOD 45 MIN: CPT | Mod: 25

## 2023-01-06 PROCEDURE — 31231 NASAL ENDOSCOPY DX: CPT

## 2023-01-06 RX ORDER — FLUTICASONE PROPIONATE 50 UG/1
50 SPRAY, METERED NASAL
Qty: 1 | Refills: 3 | Status: ACTIVE | COMMUNITY
Start: 2023-01-06 | End: 1900-01-01

## 2023-01-06 NOTE — HISTORY OF PRESENT ILLNESS
[None] : The patient is currently asymptomatic. [de-identified] : Ms. RUVALCABA is a 67 year female who presents for evaluation of chronic sinus sx \par Patient referred by Dr. Garnett\par Was previously under the care of Dr. Yeung and transferred to Dr. Saxena.\par Reports having frequent headaches and sinus pressure\par Noted that Dr. Yeung would suction mucus from her sinuses - improved pressure\par No sinus drip, no breathing problem, or epistaxis\par Prev used INCS\par No CT\par No sinonasal sx

## 2023-01-09 ENCOUNTER — APPOINTMENT (OUTPATIENT)
Dept: NEUROLOGY | Facility: CLINIC | Age: 68
End: 2023-01-09

## 2023-01-10 ENCOUNTER — APPOINTMENT (OUTPATIENT)
Dept: PULMONOLOGY | Facility: CLINIC | Age: 68
End: 2023-01-10

## 2023-01-20 ENCOUNTER — APPOINTMENT (OUTPATIENT)
Dept: ENDOCRINOLOGY | Facility: CLINIC | Age: 68
End: 2023-01-20

## 2023-01-25 ENCOUNTER — NON-APPOINTMENT (OUTPATIENT)
Age: 68
End: 2023-01-25

## 2023-02-06 PROBLEM — R71.8 ELEVATED HEMATOCRIT: Status: ACTIVE | Noted: 2021-12-01

## 2023-02-07 ENCOUNTER — APPOINTMENT (OUTPATIENT)
Dept: HEMATOLOGY ONCOLOGY | Facility: CLINIC | Age: 68
End: 2023-02-07

## 2023-02-07 DIAGNOSIS — R71.8 OTHER ABNORMALITY OF RED BLOOD CELLS: ICD-10-CM

## 2023-03-06 DIAGNOSIS — E66.9 OBESITY, UNSPECIFIED: ICD-10-CM

## 2023-05-08 ENCOUNTER — APPOINTMENT (OUTPATIENT)
Dept: NEUROLOGY | Facility: CLINIC | Age: 68
End: 2023-05-08

## 2023-05-12 ENCOUNTER — APPOINTMENT (OUTPATIENT)
Dept: OTOLARYNGOLOGY | Facility: CLINIC | Age: 68
End: 2023-05-12

## 2023-06-19 RX ORDER — NAPROXEN 500 MG/1
500 TABLET ORAL
Qty: 60 | Refills: 2 | Status: ACTIVE | COMMUNITY
Start: 2022-05-09 | End: 1900-01-01

## 2023-07-14 ENCOUNTER — APPOINTMENT (OUTPATIENT)
Dept: OTOLARYNGOLOGY | Facility: CLINIC | Age: 68
End: 2023-07-14

## 2023-07-14 ENCOUNTER — APPOINTMENT (OUTPATIENT)
Dept: PULMONOLOGY | Facility: CLINIC | Age: 68
End: 2023-07-14
Payer: COMMERCIAL

## 2023-07-14 VITALS
WEIGHT: 240 LBS | HEIGHT: 63 IN | HEART RATE: 68 BPM | BODY MASS INDEX: 42.52 KG/M2 | DIASTOLIC BLOOD PRESSURE: 80 MMHG | OXYGEN SATURATION: 94 % | TEMPERATURE: 97.3 F | SYSTOLIC BLOOD PRESSURE: 130 MMHG

## 2023-07-14 DIAGNOSIS — G47.9 SLEEP DISORDER, UNSPECIFIED: ICD-10-CM

## 2023-07-14 DIAGNOSIS — R06.2 WHEEZING: ICD-10-CM

## 2023-07-14 PROCEDURE — 99213 OFFICE O/P EST LOW 20 MIN: CPT

## 2023-07-14 NOTE — PHYSICAL EXAM
[Well Nourished] : well nourished [Enlarged Base of the Tongue] : enlarged base of the tongue [IV] : Mallampati Class: IV [Normal Appearance] : normal appearance [Supple] : supple [No Neck Mass] : no neck mass [No JVD] : no jvd [Normal Rate/Rhythm] : normal rate/rhythm [Normal S1, S2] : normal s1, s2 [No Murmurs] : no murmurs [No Resp Distress] : no resp distress [No Acc Muscle Use] : no acc muscle use [Clear to Auscultation Bilaterally] : clear to auscultation bilaterally [Benign] : benign [Not Tender] : not tender [No Masses] : no masses [Soft] : soft [No Hernias] : no hernias [Normal Bowel Sounds] : normal bowel sounds [Normal Gait] : normal gait [No Clubbing] : no clubbing [No Rash] : no rash [No Focal Deficits] : no focal deficits [Normal Affect] : normal affect [TextBox_2] : pleasant  f no distress no cough   [TextBox_11] : crowded airway  large   tongue  [TextBox_105] : + edema

## 2023-07-14 NOTE — REVIEW OF SYSTEMS
[Recent Wt Loss (___ Lbs)] : ~T recent [unfilled] lb weight loss [Wheezing] : wheezing [Chronic Pain] : chronic pain [Thyroid Problem] : thyroid problem [Obesity] : obesity [Fever] : no fever [Recent Wt Gain (___ Lbs)] : ~T no recent weight gain [Chills] : no chills [Sore Throat] : no sore throat [Dry Mouth] : no dry mouth [Cough] : no cough [Hemoptysis] : no hemoptysis [Chest Tightness] : no chest tightness [Sputum] : no sputum [Dyspnea] : no dyspnea [SOB on Exertion] : no sob on exertion [Chest Discomfort] : no chest discomfort [GERD] : no gerd [Abdominal Pain] : no abdominal pain [Nausea] : no nausea [Vomiting] : no vomiting [Dysphagia] : no dysphagia [Bleeding] : no bleeding [Myalgias] : no myalgias [Rash] : no rash [Blood Transfusion] : no blood transfusion [Clotting Disorder/ Frequent bleeding] : no clotting disorder/ frequent bleeding

## 2023-07-14 NOTE — ASSESSMENT
[FreeTextEntry1] : 67y/o   female   \par \par 1- asthma   does not like  medications / one dog \par 2-  LHR   2023  nodules and scarring stable  f/u one year\par 3- obesity   ? sleep disordered breathing \par 4- shoulder pain on  meds \par 5- ?vaccinations per primary \par \par Recommendations \par 1-   start symbicort one inh  bid rinse after use\par 2- PFT \par 3- sleep in recliner  sleep study \par \par return after above

## 2023-07-14 NOTE — HISTORY OF PRESENT ILLNESS
[Former] : former [< 20 pack-years] : < 20 pack-years [Never] : never [Awakes with Headache] : awakes with headache [Fatigue] : fatigue [Snoring] : snoring [TextBox_4] : last seen  6/2022   asthma\par \par 7/14/2023\par 69y/o  female   born in [place]    ex smoker ( only occ teens   quit  35 )   retired   hair salon + sprays  h/o  COVID     asthma \par - advair too expense \par - never took    singulair \par \par - main complaint\par \par -LHR 2/20/2023   compared 2021   \par -CT  no ptx  no consolidation \par -left  atelectsis scarring    unchanged  0.4 cm RLL nodule  and unchanged pleural based  02.cm  nodule \par -wheezing now  \par -\par   [TextBox_11] : occ [TextBox_13] : 10 [YearQuit] : ?

## 2023-08-08 ENCOUNTER — APPOINTMENT (OUTPATIENT)
Dept: OTOLARYNGOLOGY | Facility: CLINIC | Age: 68
End: 2023-08-08

## 2023-10-17 ENCOUNTER — APPOINTMENT (OUTPATIENT)
Dept: NEUROLOGY | Facility: CLINIC | Age: 68
End: 2023-10-17
Payer: COMMERCIAL

## 2023-10-17 VITALS
HEIGHT: 63 IN | DIASTOLIC BLOOD PRESSURE: 75 MMHG | WEIGHT: 238 LBS | SYSTOLIC BLOOD PRESSURE: 145 MMHG | HEART RATE: 72 BPM | BODY MASS INDEX: 42.17 KG/M2

## 2023-10-17 DIAGNOSIS — R51.9 HEADACHE, UNSPECIFIED: ICD-10-CM

## 2023-10-17 PROCEDURE — 99214 OFFICE O/P EST MOD 30 MIN: CPT

## 2023-10-31 ENCOUNTER — OUTPATIENT (OUTPATIENT)
Dept: OUTPATIENT SERVICES | Facility: HOSPITAL | Age: 68
LOS: 1 days | End: 2023-10-31
Payer: COMMERCIAL

## 2023-10-31 ENCOUNTER — APPOINTMENT (OUTPATIENT)
Dept: MRI IMAGING | Facility: CLINIC | Age: 68
End: 2023-10-31

## 2023-10-31 DIAGNOSIS — R51.9 HEADACHE, UNSPECIFIED: ICD-10-CM

## 2023-10-31 PROCEDURE — 70553 MRI BRAIN STEM W/O & W/DYE: CPT | Mod: 26

## 2023-10-31 PROCEDURE — A9585: CPT

## 2023-10-31 PROCEDURE — 70553 MRI BRAIN STEM W/O & W/DYE: CPT

## 2023-11-02 DIAGNOSIS — G96.08 OTHER CRANIAL CEREBROSPINAL FLUID LEAK: ICD-10-CM

## 2023-11-10 ENCOUNTER — APPOINTMENT (OUTPATIENT)
Dept: PULMONOLOGY | Facility: CLINIC | Age: 68
End: 2023-11-10

## 2023-11-10 ENCOUNTER — APPOINTMENT (OUTPATIENT)
Dept: ENDOCRINOLOGY | Facility: CLINIC | Age: 68
End: 2023-11-10

## 2023-11-17 ENCOUNTER — APPOINTMENT (OUTPATIENT)
Dept: ENDOCRINOLOGY | Facility: CLINIC | Age: 68
End: 2023-11-17

## 2023-12-07 NOTE — ED ADULT NURSE NOTE - CAS DISCH TRANSFER METHOD
Anticoagulation: Return Visit      Pt here for routine f/u. Pt is on warfarin for DVT, with INR goal of 2 - 2.5.    Current warfarin regimen: 2mg STTh and 1mg ROW    Assessment  (-) missed or extra doses  (-) change in medications  (-) change in vitamin K intake  (-) EtOH use  (-) signs/sx of VTE or stroke  (-) new bleeding/bruising  (-) recent falls  (-) upcoming surgeries/procedures    Plan   Pt counseled to seek emergent care in case of excessive bleeding/bruising, or if sx of VTE/stroke occur. INR is slightly high at 2.8(2.0-2.5). Venita will have some extra greens tonight and will increase her weekly greens intake and RTC in 4 weeks.   Private car

## 2023-12-26 ENCOUNTER — OUTPATIENT (OUTPATIENT)
Dept: OUTPATIENT SERVICES | Facility: HOSPITAL | Age: 68
LOS: 1 days | End: 2023-12-26
Payer: COMMERCIAL

## 2023-12-26 ENCOUNTER — APPOINTMENT (OUTPATIENT)
Dept: MRI IMAGING | Facility: CLINIC | Age: 68
End: 2023-12-26
Payer: COMMERCIAL

## 2023-12-26 DIAGNOSIS — G96.08 OTHER CRANIAL CEREBROSPINAL FLUID LEAK: ICD-10-CM

## 2023-12-26 PROCEDURE — 72148 MRI LUMBAR SPINE W/O DYE: CPT

## 2023-12-26 PROCEDURE — 72141 MRI NECK SPINE W/O DYE: CPT | Mod: 26

## 2023-12-26 PROCEDURE — 72146 MRI CHEST SPINE W/O DYE: CPT

## 2023-12-26 PROCEDURE — 72148 MRI LUMBAR SPINE W/O DYE: CPT | Mod: 26

## 2023-12-26 PROCEDURE — 72141 MRI NECK SPINE W/O DYE: CPT

## 2023-12-26 PROCEDURE — 72146 MRI CHEST SPINE W/O DYE: CPT | Mod: 26

## 2024-01-03 RX ORDER — ATOGEPANT 60 MG/1
60 TABLET ORAL
Qty: 90 | Refills: 3 | Status: ACTIVE | COMMUNITY
Start: 2024-01-02 | End: 1900-01-01

## 2024-01-12 ENCOUNTER — APPOINTMENT (OUTPATIENT)
Dept: PULMONOLOGY | Facility: CLINIC | Age: 69
End: 2024-01-12
Payer: COMMERCIAL

## 2024-01-12 VITALS — OXYGEN SATURATION: 93 % | HEART RATE: 75 BPM | SYSTOLIC BLOOD PRESSURE: 120 MMHG | DIASTOLIC BLOOD PRESSURE: 73 MMHG

## 2024-01-12 PROCEDURE — 99205 OFFICE O/P NEW HI 60 MIN: CPT | Mod: 25

## 2024-01-12 PROCEDURE — 95012 NITRIC OXIDE EXP GAS DETER: CPT

## 2024-01-12 PROCEDURE — 94060 EVALUATION OF WHEEZING: CPT

## 2024-01-12 PROCEDURE — ZZZZZ: CPT

## 2024-01-12 PROCEDURE — 94727 GAS DIL/WSHOT DETER LNG VOL: CPT

## 2024-01-12 PROCEDURE — 94729 DIFFUSING CAPACITY: CPT

## 2024-01-12 NOTE — DISCUSSION/SUMMARY
[FreeTextEntry1] : Nocturnal symptoms consistent with nocturnal asthma predominantly Unremarkable pulmonary physiology and normal NIOX not suggesting a very significant inflammatory process Obesity shortness of breath etiology and likely component of significant physical deconditioning Cannot exclude sleep-related disorder rule out obstructive sleep apnea Based on today's evaluation there is no evidence for polycythemia Recommendations .Advised patient to not use Primatene Mist If she needs rescue therapy she has available Proventil HFAasthma Hold off on using the Advair Diskus I recommend to start the Symbicort 160-4.52 puffs twice daily with detailed MDI instructions And follow-up with her [facility] Reevaluate 1 month to see if there is improvement of the nocturnal symptoms If there is no interval improvement address the issue of adding a PPI for GERD symptomatology as some component of the burning Addressed even a sleep study to completion There is no strong indication based on the prior [facility]ology CAT scan at this point for him urgent or semiurgent chest CT scan All questions answered

## 2024-01-12 NOTE — PROCEDURE
[FreeTextEntry1] : Outside chest x-ray available for review completed several months ago at  [facility] official report clear lungs  CT chest February 20, 2023 [facility] radiology Left atelectatic scar Unchanged 4 mm right lower lobe pulmonary nodule Unchanged pleural-based 2 mm pulmonary nodule  PFT January 12, 2024 Spirometry normal No bronchodilator response at FEV1 Lung volumes normal total lung capacity Decreased ERV secondary to truncal obesity Mild positive air trapping with RV/TLC ratio 131% predicted Diffusion normal 88% predicted Hemoglobin 13.8  NIOX 17 normal range January 12, 2024

## 2024-01-12 NOTE — PHYSICAL EXAM
[No Acute Distress] : no acute distress [Normal Oropharynx] : normal oropharynx [II] : Mallampati Class: II [Normal Appearance] : normal appearance [Supple] : supple [No Neck Mass] : no neck mass [No JVD] : no jvd [Normal Rate/Rhythm] : normal rate/rhythm [Normal S1, S2] : normal s1, s2 [No Murmurs] : no murmurs [No Resp Distress] : no resp distress [No Acc Muscle Use] : no acc muscle use [Normal Palpation] : normal palpation [Normal Rhythm and Effort] : normal rhythm and effort [Clear to Auscultation Bilaterally] : clear to auscultation bilaterally [Normal to Percussion] : normal to percussion [No Abnormalities] : no abnormalities [Benign] : benign [Not Tender] : not tender [Soft] : soft [No HSM] : no hsm [Normal Bowel Sounds] : normal bowel sounds [Normal Gait] : normal gait [No Clubbing] : no clubbing [No Cyanosis] : no cyanosis [No Edema] : no edema [Normal Color/ Pigmentation] : normal color/ pigmentation [No Focal Deficits] : no focal deficits [Oriented x3] : oriented x3 [Normal Affect] : normal affect [TextBox_2] : Overweight

## 2024-01-12 NOTE — REVIEW OF SYSTEMS
[Negative] : Psychiatric [TextBox_14] : Chronic sinus disease [TextBox_30] : HPI [TextBox_104] : Did not report history of eczema or psoriasis [TextBox_113] : Rule out polycythemia [TextBox_122] : History chronic headache [TextBox_144] : Thyroid cyst, graves disease

## 2024-01-12 NOTE — HISTORY OF PRESENT ILLNESS
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

## 2024-02-05 ENCOUNTER — NON-APPOINTMENT (OUTPATIENT)
Age: 69
End: 2024-02-05

## 2024-02-26 ENCOUNTER — APPOINTMENT (OUTPATIENT)
Dept: NEUROLOGY | Facility: CLINIC | Age: 69
End: 2024-02-26

## 2024-03-15 ENCOUNTER — APPOINTMENT (OUTPATIENT)
Dept: ENDOCRINOLOGY | Facility: CLINIC | Age: 69
End: 2024-03-15

## 2024-03-18 ENCOUNTER — APPOINTMENT (OUTPATIENT)
Dept: PULMONOLOGY | Facility: CLINIC | Age: 69
End: 2024-03-18

## 2024-03-25 ENCOUNTER — APPOINTMENT (OUTPATIENT)
Dept: ULTRASOUND IMAGING | Facility: CLINIC | Age: 69
End: 2024-03-25
Payer: COMMERCIAL

## 2024-03-25 ENCOUNTER — OUTPATIENT (OUTPATIENT)
Dept: OUTPATIENT SERVICES | Facility: HOSPITAL | Age: 69
LOS: 1 days | End: 2024-03-25
Payer: COMMERCIAL

## 2024-03-25 ENCOUNTER — APPOINTMENT (OUTPATIENT)
Dept: RADIOLOGY | Facility: CLINIC | Age: 69
End: 2024-03-25
Payer: COMMERCIAL

## 2024-03-25 DIAGNOSIS — Z00.8 ENCOUNTER FOR OTHER GENERAL EXAMINATION: ICD-10-CM

## 2024-03-25 PROCEDURE — 71046 X-RAY EXAM CHEST 2 VIEWS: CPT | Mod: 26

## 2024-03-25 PROCEDURE — 76536 US EXAM OF HEAD AND NECK: CPT

## 2024-03-25 PROCEDURE — 71046 X-RAY EXAM CHEST 2 VIEWS: CPT

## 2024-03-25 PROCEDURE — 76536 US EXAM OF HEAD AND NECK: CPT | Mod: 26

## 2024-05-03 ENCOUNTER — APPOINTMENT (OUTPATIENT)
Dept: CT IMAGING | Facility: CLINIC | Age: 69
End: 2024-05-03
Payer: COMMERCIAL

## 2024-05-03 ENCOUNTER — OUTPATIENT (OUTPATIENT)
Dept: OUTPATIENT SERVICES | Facility: HOSPITAL | Age: 69
LOS: 1 days | End: 2024-05-03
Payer: COMMERCIAL

## 2024-05-03 ENCOUNTER — APPOINTMENT (OUTPATIENT)
Dept: RADIOLOGY | Facility: CLINIC | Age: 69
End: 2024-05-03
Payer: COMMERCIAL

## 2024-05-03 DIAGNOSIS — Z00.8 ENCOUNTER FOR OTHER GENERAL EXAMINATION: ICD-10-CM

## 2024-05-03 PROCEDURE — 73100 X-RAY EXAM OF WRIST: CPT

## 2024-05-03 PROCEDURE — 70450 CT HEAD/BRAIN W/O DYE: CPT

## 2024-05-03 PROCEDURE — 73100 X-RAY EXAM OF WRIST: CPT | Mod: 26,RT

## 2024-05-03 PROCEDURE — 70450 CT HEAD/BRAIN W/O DYE: CPT | Mod: 26

## 2024-05-10 ENCOUNTER — APPOINTMENT (OUTPATIENT)
Dept: PULMONOLOGY | Facility: CLINIC | Age: 69
End: 2024-05-10
Payer: COMMERCIAL

## 2024-05-10 VITALS — OXYGEN SATURATION: 93 % | HEART RATE: 78 BPM | SYSTOLIC BLOOD PRESSURE: 129 MMHG | DIASTOLIC BLOOD PRESSURE: 81 MMHG

## 2024-05-10 DIAGNOSIS — U09.9 POST COVID-19 CONDITION, UNSPECIFIED: ICD-10-CM

## 2024-05-10 DIAGNOSIS — J45.909 UNSPECIFIED ASTHMA, UNCOMPLICATED: ICD-10-CM

## 2024-05-10 DIAGNOSIS — R91.1 SOLITARY PULMONARY NODULE: ICD-10-CM

## 2024-05-10 PROCEDURE — 99214 OFFICE O/P EST MOD 30 MIN: CPT | Mod: 25

## 2024-05-10 PROCEDURE — 94729 DIFFUSING CAPACITY: CPT

## 2024-05-10 PROCEDURE — 94727 GAS DIL/WSHOT DETER LNG VOL: CPT

## 2024-05-10 PROCEDURE — 94010 BREATHING CAPACITY TEST: CPT

## 2024-05-10 PROCEDURE — 95012 NITRIC OXIDE EXP GAS DETER: CPT

## 2024-05-10 PROCEDURE — ZZZZZ: CPT

## 2024-05-10 NOTE — PROCEDURE
[FreeTextEntry1] : NIOX 29 ppb  mild pos  air way inflammation 5/10/24 PFT 5/10/24 flow  rates WNL  Lung Volumes WNL pos  air trapping  DLCO 88 % WNL HGB 14.4  CXR [facility] COVID  3/25/2024  clear lungs  Outside chest x-ray available for review completed several months ago at  [facility] official report clear lungs  CT chest February 20, 2023 [facility] radiology Left atelectatic scar Unchanged 4 mm right lower lobe pulmonary nodule Unchanged pleural-based 2 mm pulmonary nodule  PFT January 12, 2024 Spirometry normal No bronchodilator response at FEV1 Lung volumes normal total lung capacity Decreased ERV secondary to truncal obesity Mild positive air trapping with RV/TLC ratio 131% predicted Diffusion normal 88% predicted Hemoglobin 13.8  NIOX 17 normal range January 12, 2024

## 2024-05-10 NOTE — HISTORY OF PRESENT ILLNESS
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

## 2024-05-13 ENCOUNTER — RX RENEWAL (OUTPATIENT)
Age: 69
End: 2024-05-13

## 2024-05-13 RX ORDER — BUDESONIDE AND FORMOTEROL FUMARATE DIHYDRATE 160; 4.5 UG/1; UG/1
160-4.5 AEROSOL RESPIRATORY (INHALATION)
Qty: 30.6 | Refills: 0 | Status: ACTIVE | COMMUNITY
Start: 2023-07-14 | End: 1900-01-01

## 2024-06-14 ENCOUNTER — OUTPATIENT (OUTPATIENT)
Dept: OUTPATIENT SERVICES | Facility: HOSPITAL | Age: 69
LOS: 1 days | End: 2024-06-14
Payer: COMMERCIAL

## 2024-06-14 ENCOUNTER — APPOINTMENT (OUTPATIENT)
Dept: MRI IMAGING | Facility: CLINIC | Age: 69
End: 2024-06-14
Payer: COMMERCIAL

## 2024-06-14 ENCOUNTER — APPOINTMENT (OUTPATIENT)
Dept: CT IMAGING | Facility: CLINIC | Age: 69
End: 2024-06-14
Payer: COMMERCIAL

## 2024-06-14 DIAGNOSIS — Z00.8 ENCOUNTER FOR OTHER GENERAL EXAMINATION: ICD-10-CM

## 2024-06-14 DIAGNOSIS — R91.1 SOLITARY PULMONARY NODULE: ICD-10-CM

## 2024-06-14 PROCEDURE — 73221 MRI JOINT UPR EXTREM W/O DYE: CPT

## 2024-06-14 PROCEDURE — 73221 MRI JOINT UPR EXTREM W/O DYE: CPT | Mod: 26,RT

## 2024-06-14 PROCEDURE — 71250 CT THORAX DX C-: CPT | Mod: 26

## 2024-06-14 PROCEDURE — 71250 CT THORAX DX C-: CPT

## 2024-06-21 ENCOUNTER — APPOINTMENT (OUTPATIENT)
Dept: PULMONOLOGY | Facility: CLINIC | Age: 69
End: 2024-06-21

## 2024-06-24 ENCOUNTER — NON-APPOINTMENT (OUTPATIENT)
Age: 69
End: 2024-06-24

## 2024-07-17 ENCOUNTER — APPOINTMENT (OUTPATIENT)
Dept: PULMONOLOGY | Facility: CLINIC | Age: 69
End: 2024-07-17
Payer: COMMERCIAL

## 2024-07-17 VITALS — OXYGEN SATURATION: 94 % | HEART RATE: 60 BPM | SYSTOLIC BLOOD PRESSURE: 128 MMHG | DIASTOLIC BLOOD PRESSURE: 77 MMHG

## 2024-07-17 DIAGNOSIS — J45.909 UNSPECIFIED ASTHMA, UNCOMPLICATED: ICD-10-CM

## 2024-07-17 DIAGNOSIS — R91.1 SOLITARY PULMONARY NODULE: ICD-10-CM

## 2024-07-17 PROCEDURE — 99214 OFFICE O/P EST MOD 30 MIN: CPT | Mod: 25

## 2024-07-17 PROCEDURE — 95012 NITRIC OXIDE EXP GAS DETER: CPT

## 2024-07-17 PROCEDURE — 94060 EVALUATION OF WHEEZING: CPT

## 2024-08-11 ENCOUNTER — OUTPATIENT (OUTPATIENT)
Dept: OUTPATIENT SERVICES | Facility: HOSPITAL | Age: 69
LOS: 1 days | Discharge: HOME | End: 2024-08-11

## 2024-08-11 DIAGNOSIS — R79.9 ABNORMAL FINDING OF BLOOD CHEMISTRY, UNSPECIFIED: ICD-10-CM

## 2024-08-18 ENCOUNTER — NON-APPOINTMENT (OUTPATIENT)
Age: 69
End: 2024-08-18

## 2024-08-19 ENCOUNTER — APPOINTMENT (OUTPATIENT)
Dept: HEMATOLOGY ONCOLOGY | Facility: CLINIC | Age: 69
End: 2024-08-19
Payer: COMMERCIAL

## 2024-08-19 VITALS
DIASTOLIC BLOOD PRESSURE: 70 MMHG | BODY MASS INDEX: 41.88 KG/M2 | OXYGEN SATURATION: 95 % | WEIGHT: 236.34 LBS | HEIGHT: 63.03 IN | TEMPERATURE: 98 F | HEART RATE: 59 BPM | SYSTOLIC BLOOD PRESSURE: 115 MMHG | RESPIRATION RATE: 16 BRPM

## 2024-08-19 DIAGNOSIS — R71.8 OTHER ABNORMALITY OF RED BLOOD CELLS: ICD-10-CM

## 2024-08-19 PROCEDURE — 99213 OFFICE O/P EST LOW 20 MIN: CPT

## 2024-08-19 RX ORDER — CHROMIUM 200 MCG
TABLET ORAL
Refills: 0 | Status: ACTIVE | COMMUNITY

## 2024-08-19 RX ORDER — ROSUVASTATIN CALCIUM 5 MG/1
5 TABLET, FILM COATED ORAL
Refills: 0 | Status: ACTIVE | COMMUNITY

## 2024-08-19 NOTE — HISTORY OF PRESENT ILLNESS
[de-identified] : 12/1/21-Patient presented at the request of her PCP for an elevated Hct. found on routine lab work. Erythropoietin level and TUTU 2 mutation studies WNL.\par  \par  \par   [de-identified] : Last visit 7/2022-back for routine f/u. Takes PO vitamin D supplement. Breasts monitored by Dr. Thompson. No current pulmonary/GI//bony/neuro complaints. No fevers. No H/A. No h/o abnormal bleeding. Sees Dr. Balderas (endocrine) for hyperthyroidism.

## 2024-08-19 NOTE — HISTORY OF PRESENT ILLNESS
[de-identified] : 12/1/21-Patient presented at the request of her PCP for an elevated Hct. found on routine lab work. Erythropoietin level and TUTU 2 mutation studies WNL.\par  \par  \par   [de-identified] : Last visit 7/2022-back for routine f/u. Takes PO vitamin D supplement. Breasts monitored by Dr. Thompson. No current pulmonary/GI//bony/neuro complaints. No fevers. No H/A. No h/o abnormal bleeding. Sees Dr. Balderas (endocrine) for hyperthyroidism.

## 2024-08-19 NOTE — ASSESSMENT
[FreeTextEntry1] : CBC reviewed. h/o Mildly elevated hematocrit-Erythropoietin level WNL and TUTU 2 mutation studies normal 12/2021. Currently clinically suspect was secondary/reactive process-?hemoconcentration with daily diuretic use. P.O. hydration as tolerated.  8/3/2024 CBC WNL.   Patient was given the opportunity to ask questions.  Her questions have been answered to her apparent satisfaction at this time.  She will no longer actively follow-up in this office.  She will continue under the care of her PCP and have her lab work monitored.  Should her hematologic/clinical scenario change/worsen, offered our availability for her to re-call for further evaluation as needed. Patient appreciative.  -->RTO PRN

## 2024-09-16 ENCOUNTER — APPOINTMENT (OUTPATIENT)
Dept: MRI IMAGING | Facility: CLINIC | Age: 69
End: 2024-09-16
Payer: COMMERCIAL

## 2024-09-16 ENCOUNTER — OUTPATIENT (OUTPATIENT)
Dept: OUTPATIENT SERVICES | Facility: HOSPITAL | Age: 69
LOS: 1 days | End: 2024-09-16
Payer: COMMERCIAL

## 2024-09-16 ENCOUNTER — APPOINTMENT (OUTPATIENT)
Dept: ULTRASOUND IMAGING | Facility: CLINIC | Age: 69
End: 2024-09-16
Payer: COMMERCIAL

## 2024-09-16 ENCOUNTER — APPOINTMENT (OUTPATIENT)
Dept: RADIOLOGY | Facility: CLINIC | Age: 69
End: 2024-09-16
Payer: COMMERCIAL

## 2024-09-16 DIAGNOSIS — D24.1 BENIGN NEOPLASM OF RIGHT BREAST: ICD-10-CM

## 2024-09-16 DIAGNOSIS — Z00.8 ENCOUNTER FOR OTHER GENERAL EXAMINATION: ICD-10-CM

## 2024-09-16 PROCEDURE — 76536 US EXAM OF HEAD AND NECK: CPT | Mod: 26

## 2024-09-16 PROCEDURE — 77080 DXA BONE DENSITY AXIAL: CPT | Mod: 26

## 2024-09-16 PROCEDURE — 76536 US EXAM OF HEAD AND NECK: CPT

## 2024-09-16 PROCEDURE — 77080 DXA BONE DENSITY AXIAL: CPT

## 2024-09-30 ENCOUNTER — APPOINTMENT (OUTPATIENT)
Dept: OPHTHALMOLOGY | Facility: CLINIC | Age: 69
End: 2024-09-30

## 2024-10-01 ENCOUNTER — APPOINTMENT (OUTPATIENT)
Dept: NEUROLOGY | Facility: CLINIC | Age: 69
End: 2024-10-01
Payer: COMMERCIAL

## 2024-10-01 VITALS
HEIGHT: 63 IN | HEART RATE: 72 BPM | BODY MASS INDEX: 41.82 KG/M2 | SYSTOLIC BLOOD PRESSURE: 130 MMHG | WEIGHT: 236 LBS | DIASTOLIC BLOOD PRESSURE: 70 MMHG

## 2024-10-01 DIAGNOSIS — R68.89 OTHER GENERAL SYMPTOMS AND SIGNS: ICD-10-CM

## 2024-10-01 DIAGNOSIS — H05.20 UNSPECIFIED EXOPHTHALMOS: ICD-10-CM

## 2024-10-01 PROCEDURE — 99214 OFFICE O/P EST MOD 30 MIN: CPT

## 2024-10-01 NOTE — ASSESSMENT
[FreeTextEntry1] : Mrs. Sparks is a 69-year-old with longstanding periorbital pressure and proptosis.  Evaluations for thyroid eye disease have been unremarkable.  Prior CT and MR imaging revealed absence of enlargement of the extraocular muscles.  I question what whether her except almost might be due to Cushing's disease given her moonlight facies and buffalo hump.  An a.m. cortisol in 2018 was normal.  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  I suggested that she undergo MRIs of the brain, pituitary and orbits with and without contrast.  A comprehensive serologic evaluation will be performed including a.m. cortisol, IgG subsets and thyroid-stimulating antibody, thyrotropin receptor antibody and antithyroid antibodies.  Further management will depend upon these results or clinical course.

## 2024-10-01 NOTE — PHYSICAL EXAM
[FreeTextEntry1] : Constitutional:  Patient was well-developed, well-nourished and in no acute distress.   Head:  Normocephalic, atraumatic. Tympanic membranes were clear.   Neck:  Supple with full range of motion.   Cardiovascular:  Cardiac rhythm was regular without murmur. There were no carotid bruits. Peripheral pulses were full and symmetric.   Respiratory:  Lungs were clear.   Abdomen:  Soft and nontender.   Spine:  Nontender.   Skin:  There were no rashes.   NEUROLOGICAL EXAMINATION:  Mental Status: Patient was alert and oriented. Speech was fluent. There was no dysarthria.   Cranial Nerves:   II: Visual acuity was 20/251 in the right eye 20/420 in the left eye with pinhole and near card. Pupils were equal and reactive. Visual fields were full.  The right optic disc was not visualized well due to cataract.  The left optic disc was normal.  She had bilateral proptosis and conjunctival injection.  III, IV, VI:  Eye movements were full without nystagmus.   V: Facial sensation was intact.   VII: Facial strength was normal.   VIII: Hearing was equal.   IX, X: Palatal movement was normal. Phonation was normal.   XI: Sternocleidomastoids and trapezii were normal.   XII: Tongue was midline and movements normal. There was no lingual atrophy or fasciculations.   Motor Examination: Muscle bulk, tone and strength were normal.   Sensory Examination: Pinprick, vibration and joint position sense were intact.   Reflexes: DTRs were 2+ throughout except the ankle jerks which were absent.   Plantar Responses: Plantar responses were flexor.   Coordination/Cerebellar Function: There was no dysmetria on finger to nose or heel to shin testing.   Gait/Stance: Gait and tandem were normal. Romberg was negative.

## 2024-10-01 NOTE — CONSULT LETTER
[Dear  ___] : Dear  [unfilled], [Consult Letter:] : I had the pleasure of evaluating your patient, [unfilled]. [Please see my note below.] : Please see my note below. [Consult Closing:] : Thank you very much for allowing me to participate in the care of this patient.  If you have any questions, please do not hesitate to contact me. [Sincerely,] : Sincerely, [DrSushil  ___] : Dr. SHAW [FreeTextEntry3] : Huber Zapien MD

## 2024-10-01 NOTE — HISTORY OF PRESENT ILLNESS
[FreeTextEntry1] : Mrs. Rosalia Sparks returns to the office having been initially evaluated on October 17, 2023.  She is a 69-year-old right-handed patient who had a history of aching headaches with her menses.  She had been menopausal since age 60.  Soon thereafter, she began experiencing frontal temporal and periorbital pressure headaches which would occur upon awakening in the morning.  She had been taking Excedrin migraine twice a day for years.  This alleviated her pain.  She also took an occasional Ubrelvy 100 mg.  She denied nausea, vomiting, photo phonophobia or other complicated symptomatology.  There was no family history of migraine.  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  There is no change compared to 2016.  An MRI of the cervical spine performed in 2022 revealed multilevel spondylosis.  She was evaluated by Dr. Stanislaw Rushing in 2022.  He recommended treatment with topiramate but she declined.  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  The presence of bifrontal hygromas and prior lumbar surgery raise concern of intracranial hypotension.  An MRI of the brain revealed bilateral frontal hygromas unchanged compared to 2021.  An MR myelogram revealed no evidence of CSF leak.  She was lost to follow-up.  She reports daily persistent periorbital sharp pain and pressure behind her eyes.  She has been evaluated for thyroid eye disease but serologies were unremarkable.  She was recently seen by Dr. Hamilton at [facility].  CT of the orbits was recommended and referral to Dr. Mami Olivares.  She complains of itchy and injected conjunctiva.  Past surgical history is notable for lumbar laminectomy 30 years ago and procedures on her nipples for premalignant lesions.  She suffers from hyperlipidemia and fatty liver.  There is no history of hypertension, diabetes, cardiac, pulmonary, renal, gastrointestinal, thyroid, hematologic or cerebrovascular disease.  She denies a history of Graves' disease.  She has no allergies.  Medications include atenolol, hydrochlorothiazide, ibuprofen, famotidine, high-dose aspirin and Excedrin.  She is a former smoker and nondrinker.  She works as a caretaker.  She is .  Family history is notable for a mother and sister with breast cancer, another sister with uterine cancer and a maternal grandmother with pancreatic cancer.

## 2024-10-04 ENCOUNTER — APPOINTMENT (OUTPATIENT)
Dept: CT IMAGING | Facility: CLINIC | Age: 69
End: 2024-10-04

## 2024-10-15 ENCOUNTER — LABORATORY RESULT (OUTPATIENT)
Age: 69
End: 2024-10-15

## 2024-10-15 ENCOUNTER — NON-APPOINTMENT (OUTPATIENT)
Age: 69
End: 2024-10-15

## 2024-10-18 ENCOUNTER — APPOINTMENT (OUTPATIENT)
Dept: MRI IMAGING | Facility: CLINIC | Age: 69
End: 2024-10-18

## 2024-10-18 ENCOUNTER — APPOINTMENT (OUTPATIENT)
Dept: PULMONOLOGY | Facility: CLINIC | Age: 69
End: 2024-10-18

## 2024-10-23 DIAGNOSIS — H05.20 UNSPECIFIED EXOPHTHALMOS: ICD-10-CM

## 2024-10-28 ENCOUNTER — LABORATORY RESULT (OUTPATIENT)
Age: 69
End: 2024-10-28

## 2024-10-29 ENCOUNTER — RX RENEWAL (OUTPATIENT)
Age: 69
End: 2024-10-29

## 2024-10-29 LAB
C3 SERPL-MCNC: 134 MG/DL
C4 SERPL-MCNC: 27 MG/DL
CCP AB SER IA-ACNC: 17.6 U/ML
CRP SERPL-MCNC: 5 MG/L
ENA SS-A AB SER IA-ACNC: <0.2 AL
ENA SS-B AB SER IA-ACNC: <0.2 AL
ERYTHROCYTE [SEDIMENTATION RATE] IN BLOOD BY WESTERGREN METHOD: 11 MM/HR
HBV SURFACE AG SER QL: NONREACTIVE
HCV AB SER QL: NONREACTIVE
HCV S/CO RATIO: 0.07 S/CO
RF+CCP IGG SER-IMP: POSITIVE
RHEUMATOID FACT SER QL: 10 IU/ML

## 2024-10-30 LAB — ANACR T: NEGATIVE

## 2024-10-31 LAB
HTLV I+II AB SER QL: NEGATIVE
T PALLIDUM AB SER QL IA: NEGATIVE

## 2024-11-25 ENCOUNTER — APPOINTMENT (OUTPATIENT)
Dept: ORTHOPEDIC SURGERY | Facility: CLINIC | Age: 69
End: 2024-11-25
Payer: COMMERCIAL

## 2024-12-02 ENCOUNTER — APPOINTMENT (OUTPATIENT)
Dept: OPHTHALMOLOGY | Facility: CLINIC | Age: 69
End: 2024-12-02

## 2024-12-02 ENCOUNTER — NON-APPOINTMENT (OUTPATIENT)
Age: 69
End: 2024-12-02

## 2024-12-02 ENCOUNTER — APPOINTMENT (OUTPATIENT)
Dept: ORTHOPEDIC SURGERY | Facility: CLINIC | Age: 69
End: 2024-12-02
Payer: COMMERCIAL

## 2024-12-02 VITALS — HEIGHT: 63 IN | BODY MASS INDEX: 37.21 KG/M2 | WEIGHT: 210 LBS

## 2024-12-02 DIAGNOSIS — M25.531 PAIN IN RIGHT WRIST: ICD-10-CM

## 2024-12-02 DIAGNOSIS — M77.8 OTHER ENTHESOPATHIES, NOT ELSEWHERE CLASSIFIED: ICD-10-CM

## 2024-12-02 DIAGNOSIS — G56.03 CARPAL TUNNEL SYNDROM,BILATERAL UPPER LIMBS: ICD-10-CM

## 2024-12-02 PROCEDURE — 99203 OFFICE O/P NEW LOW 30 MIN: CPT

## 2024-12-20 ENCOUNTER — APPOINTMENT (OUTPATIENT)
Dept: OPHTHALMOLOGY | Facility: CLINIC | Age: 69
End: 2024-12-20
Payer: COMMERCIAL

## 2024-12-20 ENCOUNTER — NON-APPOINTMENT (OUTPATIENT)
Age: 69
End: 2024-12-20

## 2024-12-20 PROCEDURE — 92285 EXTERNAL OCULAR PHOTOGRAPHY: CPT

## 2024-12-20 PROCEDURE — 99204 OFFICE O/P NEW MOD 45 MIN: CPT

## 2025-01-13 ENCOUNTER — APPOINTMENT (OUTPATIENT)
Dept: PULMONOLOGY | Facility: CLINIC | Age: 70
End: 2025-01-13

## 2025-04-09 ENCOUNTER — APPOINTMENT (OUTPATIENT)
Dept: RHEUMATOLOGY | Facility: CLINIC | Age: 70
End: 2025-04-09
Payer: COMMERCIAL

## 2025-04-09 VITALS
HEART RATE: 72 BPM | SYSTOLIC BLOOD PRESSURE: 124 MMHG | DIASTOLIC BLOOD PRESSURE: 78 MMHG | OXYGEN SATURATION: 95 % | TEMPERATURE: 97.6 F | WEIGHT: 215 LBS | BODY MASS INDEX: 38.09 KG/M2 | HEIGHT: 63 IN

## 2025-04-09 DIAGNOSIS — M25.512 PAIN IN LEFT SHOULDER: ICD-10-CM

## 2025-04-09 DIAGNOSIS — M25.50 PAIN IN UNSPECIFIED JOINT: ICD-10-CM

## 2025-04-09 PROCEDURE — 99204 OFFICE O/P NEW MOD 45 MIN: CPT | Mod: 25

## 2025-04-09 PROCEDURE — 20610 DRAIN/INJ JOINT/BURSA W/O US: CPT | Mod: LT

## 2025-04-09 RX ORDER — TRIAMCINOLONE ACETONIDE 80 MG/ML
80 INJECTION, SUSPENSION INTRA-ARTICULAR; INTRAMUSCULAR
Qty: 8 | Refills: 0 | Status: COMPLETED | OUTPATIENT
Start: 2025-04-09

## 2025-04-09 RX ADMIN — TRIAMCINOLONE ACETONIDE 0 MG/ML: 80 INJECTION, SUSPENSION INTRA-ARTICULAR; INTRAMUSCULAR at 00:00

## 2025-04-19 ENCOUNTER — OUTPATIENT (OUTPATIENT)
Dept: OUTPATIENT SERVICES | Facility: HOSPITAL | Age: 70
LOS: 1 days | End: 2025-04-19
Payer: COMMERCIAL

## 2025-04-19 ENCOUNTER — APPOINTMENT (OUTPATIENT)
Dept: ULTRASOUND IMAGING | Facility: CLINIC | Age: 70
End: 2025-04-19

## 2025-04-19 DIAGNOSIS — Z00.8 ENCOUNTER FOR OTHER GENERAL EXAMINATION: ICD-10-CM

## 2025-04-19 PROCEDURE — 93970 EXTREMITY STUDY: CPT | Mod: 26

## 2025-04-19 PROCEDURE — 93970 EXTREMITY STUDY: CPT

## 2025-04-30 ENCOUNTER — APPOINTMENT (OUTPATIENT)
Dept: RHEUMATOLOGY | Facility: CLINIC | Age: 70
End: 2025-04-30
Payer: COMMERCIAL

## 2025-04-30 ENCOUNTER — APPOINTMENT (OUTPATIENT)
Dept: ENDOCRINOLOGY | Facility: CLINIC | Age: 70
End: 2025-04-30
Payer: COMMERCIAL

## 2025-04-30 VITALS
DIASTOLIC BLOOD PRESSURE: 68 MMHG | WEIGHT: 215 LBS | TEMPERATURE: 97.3 F | RESPIRATION RATE: 16 BRPM | HEIGHT: 63 IN | OXYGEN SATURATION: 96 % | BODY MASS INDEX: 38.09 KG/M2 | HEART RATE: 75 BPM | SYSTOLIC BLOOD PRESSURE: 118 MMHG

## 2025-04-30 DIAGNOSIS — M25.511 PAIN IN RIGHT SHOULDER: ICD-10-CM

## 2025-04-30 DIAGNOSIS — H05.20 UNSPECIFIED EXOPHTHALMOS: ICD-10-CM

## 2025-04-30 DIAGNOSIS — E04.1 NONTOXIC SINGLE THYROID NODULE: ICD-10-CM

## 2025-04-30 PROCEDURE — 20610 DRAIN/INJ JOINT/BURSA W/O US: CPT | Mod: RT

## 2025-04-30 PROCEDURE — 99215 OFFICE O/P EST HI 40 MIN: CPT

## 2025-04-30 RX ORDER — TRIAMCINOLONE ACETONIDE 80 MG/ML
80 INJECTION, SUSPENSION INTRA-ARTICULAR; INTRAMUSCULAR
Qty: 8 | Refills: 0 | Status: COMPLETED | OUTPATIENT
Start: 2025-04-30

## 2025-04-30 RX ADMIN — TRIAMCINOLONE ACETONIDE 0 MG/ML: 80 INJECTION, SUSPENSION INTRA-ARTICULAR; INTRAMUSCULAR at 00:00

## 2025-05-19 ENCOUNTER — APPOINTMENT (OUTPATIENT)
Dept: PULMONOLOGY | Facility: CLINIC | Age: 70
End: 2025-05-19
Payer: COMMERCIAL

## 2025-05-19 ENCOUNTER — APPOINTMENT (OUTPATIENT)
Dept: CT IMAGING | Facility: CLINIC | Age: 70
End: 2025-05-19
Payer: COMMERCIAL

## 2025-05-19 ENCOUNTER — OUTPATIENT (OUTPATIENT)
Dept: OUTPATIENT SERVICES | Facility: HOSPITAL | Age: 70
LOS: 1 days | End: 2025-05-19
Payer: COMMERCIAL

## 2025-05-19 ENCOUNTER — RX RENEWAL (OUTPATIENT)
Age: 70
End: 2025-05-19

## 2025-05-19 ENCOUNTER — OUTPATIENT (OUTPATIENT)
Dept: OUTPATIENT SERVICES | Facility: HOSPITAL | Age: 70
LOS: 1 days | Discharge: HOME | End: 2025-05-19

## 2025-05-19 VITALS
WEIGHT: 215 LBS | RESPIRATION RATE: 16 BRPM | DIASTOLIC BLOOD PRESSURE: 75 MMHG | BODY MASS INDEX: 38.57 KG/M2 | HEART RATE: 61 BPM | HEIGHT: 62.5 IN | SYSTOLIC BLOOD PRESSURE: 129 MMHG | OXYGEN SATURATION: 94 %

## 2025-05-19 DIAGNOSIS — R91.1 SOLITARY PULMONARY NODULE: ICD-10-CM

## 2025-05-19 DIAGNOSIS — J45.909 UNSPECIFIED ASTHMA, UNCOMPLICATED: ICD-10-CM

## 2025-05-19 DIAGNOSIS — R79.9 ABNORMAL FINDING OF BLOOD CHEMISTRY, UNSPECIFIED: ICD-10-CM

## 2025-05-19 DIAGNOSIS — Z00.8 ENCOUNTER FOR OTHER GENERAL EXAMINATION: ICD-10-CM

## 2025-05-19 PROCEDURE — 94727 GAS DIL/WSHOT DETER LNG VOL: CPT

## 2025-05-19 PROCEDURE — 94010 BREATHING CAPACITY TEST: CPT

## 2025-05-19 PROCEDURE — 94729 DIFFUSING CAPACITY: CPT

## 2025-05-19 PROCEDURE — 95012 NITRIC OXIDE EXP GAS DETER: CPT

## 2025-05-19 PROCEDURE — ZZZZZ: CPT

## 2025-05-19 PROCEDURE — 71250 CT THORAX DX C-: CPT

## 2025-05-19 PROCEDURE — 99214 OFFICE O/P EST MOD 30 MIN: CPT | Mod: 25

## 2025-05-19 PROCEDURE — 71250 CT THORAX DX C-: CPT | Mod: 26

## 2025-05-20 ENCOUNTER — APPOINTMENT (OUTPATIENT)
Dept: HEMATOLOGY ONCOLOGY | Facility: CLINIC | Age: 70
End: 2025-05-20
Payer: COMMERCIAL

## 2025-05-20 VITALS
TEMPERATURE: 97.5 F | OXYGEN SATURATION: 94 % | DIASTOLIC BLOOD PRESSURE: 75 MMHG | SYSTOLIC BLOOD PRESSURE: 120 MMHG | RESPIRATION RATE: 16 BRPM | BODY MASS INDEX: 38.73 KG/M2 | HEART RATE: 84 BPM | WEIGHT: 215.17 LBS

## 2025-05-20 DIAGNOSIS — R71.8 OTHER ABNORMALITY OF RED BLOOD CELLS: ICD-10-CM

## 2025-05-20 PROCEDURE — 99213 OFFICE O/P EST LOW 20 MIN: CPT

## 2025-05-20 RX ORDER — ERGOCALCIFEROL 1.25 MG/1
1.25 MG CAPSULE, LIQUID FILLED ORAL
Qty: 10 | Refills: 0 | Status: ACTIVE | COMMUNITY
Start: 2025-05-05

## 2025-05-20 RX ORDER — VALSARTAN 40 MG/1
40 TABLET, COATED ORAL
Qty: 85 | Refills: 0 | Status: ACTIVE | COMMUNITY
Start: 2025-05-12

## 2025-05-20 RX ORDER — DOXYCYCLINE HYCLATE 100 MG/1
100 TABLET ORAL
Qty: 42 | Refills: 0 | Status: ACTIVE | COMMUNITY
Start: 2025-05-17

## 2025-05-20 RX ORDER — HYDROCHLOROTHIAZIDE 25 MG/1
25 TABLET ORAL
Qty: 90 | Refills: 0 | Status: ACTIVE | COMMUNITY
Start: 2025-05-08

## 2025-05-30 DIAGNOSIS — G47.9 SLEEP DISORDER, UNSPECIFIED: ICD-10-CM

## 2025-06-03 ENCOUNTER — APPOINTMENT (OUTPATIENT)
Dept: PULMONOLOGY | Facility: CLINIC | Age: 70
End: 2025-06-03
Payer: COMMERCIAL

## 2025-06-05 PROCEDURE — 95800 SLP STDY UNATTENDED: CPT | Mod: 52

## 2025-06-06 ENCOUNTER — RX RENEWAL (OUTPATIENT)
Age: 70
End: 2025-06-06

## 2025-06-06 PROCEDURE — 95800 SLP STDY UNATTENDED: CPT | Mod: 52

## 2025-06-06 RX ORDER — HYDROXYCHLOROQUINE SULFATE 200 MG/1
200 TABLET, FILM COATED ORAL
Qty: 180 | Refills: 0 | Status: ACTIVE | COMMUNITY
Start: 2025-06-06 | End: 1900-01-01

## 2025-06-16 ENCOUNTER — NON-APPOINTMENT (OUTPATIENT)
Age: 70
End: 2025-06-16

## 2025-06-16 ENCOUNTER — APPOINTMENT (OUTPATIENT)
Dept: PULMONOLOGY | Facility: CLINIC | Age: 70
End: 2025-06-16
Payer: COMMERCIAL

## 2025-06-16 VITALS — HEART RATE: 64 BPM | DIASTOLIC BLOOD PRESSURE: 82 MMHG | SYSTOLIC BLOOD PRESSURE: 126 MMHG | OXYGEN SATURATION: 94 %

## 2025-06-16 PROBLEM — G47.33 OBSTRUCTIVE SLEEP APNEA: Status: ACTIVE | Noted: 2025-06-09

## 2025-06-16 PROBLEM — G47.34 NOCTURNAL HYPOXEMIA: Status: ACTIVE | Noted: 2025-06-16

## 2025-06-16 PROCEDURE — 94010 BREATHING CAPACITY TEST: CPT

## 2025-06-16 PROCEDURE — 99214 OFFICE O/P EST MOD 30 MIN: CPT | Mod: 25

## 2025-06-16 PROCEDURE — 95012 NITRIC OXIDE EXP GAS DETER: CPT

## 2025-06-26 ENCOUNTER — OUTPATIENT (OUTPATIENT)
Dept: OUTPATIENT SERVICES | Facility: HOSPITAL | Age: 70
LOS: 1 days | End: 2025-06-26
Payer: COMMERCIAL

## 2025-06-26 ENCOUNTER — APPOINTMENT (OUTPATIENT)
Dept: RADIOLOGY | Facility: HOSPITAL | Age: 70
End: 2025-06-26
Payer: COMMERCIAL

## 2025-06-26 DIAGNOSIS — Z00.8 ENCOUNTER FOR OTHER GENERAL EXAMINATION: ICD-10-CM

## 2025-06-26 PROCEDURE — 71046 X-RAY EXAM CHEST 2 VIEWS: CPT | Mod: 26

## 2025-06-26 PROCEDURE — 71046 X-RAY EXAM CHEST 2 VIEWS: CPT

## 2025-07-28 ENCOUNTER — APPOINTMENT (OUTPATIENT)
Dept: PULMONOLOGY | Facility: CLINIC | Age: 70
End: 2025-07-28

## 2025-08-20 ENCOUNTER — APPOINTMENT (OUTPATIENT)
Dept: RHEUMATOLOGY | Facility: CLINIC | Age: 70
End: 2025-08-20

## 2025-09-04 ENCOUNTER — RX RENEWAL (OUTPATIENT)
Age: 70
End: 2025-09-04